# Patient Record
Sex: MALE | Race: BLACK OR AFRICAN AMERICAN
[De-identification: names, ages, dates, MRNs, and addresses within clinical notes are randomized per-mention and may not be internally consistent; named-entity substitution may affect disease eponyms.]

---

## 2019-11-12 ENCOUNTER — HOSPITAL ENCOUNTER (INPATIENT)
Dept: HOSPITAL 15 - ER | Age: 69
LOS: 3 days | Discharge: HOME | DRG: 697 | End: 2019-11-15
Attending: INTERNAL MEDICINE | Admitting: NURSE PRACTITIONER
Payer: COMMERCIAL

## 2019-11-12 VITALS — HEIGHT: 65 IN | WEIGHT: 137.13 LBS | BODY MASS INDEX: 22.85 KG/M2

## 2019-11-12 DIAGNOSIS — N40.1: ICD-10-CM

## 2019-11-12 DIAGNOSIS — N39.498: ICD-10-CM

## 2019-11-12 DIAGNOSIS — E11.9: ICD-10-CM

## 2019-11-12 DIAGNOSIS — N35.919: Primary | ICD-10-CM

## 2019-11-12 DIAGNOSIS — N13.30: ICD-10-CM

## 2019-11-12 DIAGNOSIS — E87.6: ICD-10-CM

## 2019-11-12 DIAGNOSIS — K62.5: ICD-10-CM

## 2019-11-12 DIAGNOSIS — R33.8: ICD-10-CM

## 2019-11-12 DIAGNOSIS — I10: ICD-10-CM

## 2019-11-12 DIAGNOSIS — D64.9: ICD-10-CM

## 2019-11-12 DIAGNOSIS — I69.354: ICD-10-CM

## 2019-11-12 LAB
ALBUMIN SERPL-MCNC: 3 G/DL (ref 3.4–5)
ALP SERPL-CCNC: 80 U/L (ref 45–117)
ALT SERPL-CCNC: 16 U/L (ref 16–61)
ANION GAP SERPL CALCULATED.3IONS-SCNC: 5 MMOL/L (ref 5–15)
APTT PPP: 27.5 SEC (ref 23.64–32.05)
BILIRUB SERPL-MCNC: 0.4 MG/DL (ref 0.2–1)
BUN SERPL-MCNC: 20 MG/DL (ref 7–18)
BUN/CREAT SERPL: 22.2
CALCIUM SERPL-MCNC: 8.3 MG/DL (ref 8.5–10.1)
CHLORIDE SERPL-SCNC: 106 MMOL/L (ref 98–107)
CO2 SERPL-SCNC: 29 MMOL/L (ref 21–32)
GLUCOSE SERPL-MCNC: 98 MG/DL (ref 74–106)
HCT VFR BLD AUTO: 35.8 % (ref 41–53)
HCT VFR BLD AUTO: 37.6 % (ref 41–53)
HGB BLD-MCNC: 12 G/DL (ref 13.5–17.5)
HGB BLD-MCNC: 12.6 G/DL (ref 13.5–17.5)
INR PPP: 1.14 (ref 0.9–1.15)
MCH RBC QN AUTO: 30.2 PG (ref 28–32)
MCV RBC AUTO: 89.7 FL (ref 80–100)
POTASSIUM SERPL-SCNC: 2.8 MMOL/L (ref 3.5–5.1)
PROT SERPL-MCNC: 7.5 G/DL (ref 6.4–8.2)
SODIUM SERPL-SCNC: 140 MMOL/L (ref 136–145)

## 2019-11-12 PROCEDURE — 36415 COLL VENOUS BLD VENIPUNCTURE: CPT

## 2019-11-12 PROCEDURE — 80053 COMPREHEN METABOLIC PANEL: CPT

## 2019-11-12 PROCEDURE — 86900 BLOOD TYPING SEROLOGIC ABO: CPT

## 2019-11-12 PROCEDURE — 85007 BL SMEAR W/DIFF WBC COUNT: CPT

## 2019-11-12 PROCEDURE — 85014 HEMATOCRIT: CPT

## 2019-11-12 PROCEDURE — 87086 URINE CULTURE/COLONY COUNT: CPT

## 2019-11-12 PROCEDURE — 81001 URINALYSIS AUTO W/SCOPE: CPT

## 2019-11-12 PROCEDURE — 96365 THER/PROPH/DIAG IV INF INIT: CPT

## 2019-11-12 PROCEDURE — 85027 COMPLETE CBC AUTOMATED: CPT

## 2019-11-12 PROCEDURE — 85610 PROTHROMBIN TIME: CPT

## 2019-11-12 PROCEDURE — 71045 X-RAY EXAM CHEST 1 VIEW: CPT

## 2019-11-12 PROCEDURE — 84132 ASSAY OF SERUM POTASSIUM: CPT

## 2019-11-12 PROCEDURE — 86901 BLOOD TYPING SEROLOGIC RH(D): CPT

## 2019-11-12 PROCEDURE — 80048 BASIC METABOLIC PNL TOTAL CA: CPT

## 2019-11-12 PROCEDURE — 85730 THROMBOPLASTIN TIME PARTIAL: CPT

## 2019-11-12 PROCEDURE — 96375 TX/PRO/DX INJ NEW DRUG ADDON: CPT

## 2019-11-12 PROCEDURE — 85018 HEMOGLOBIN: CPT

## 2019-11-12 PROCEDURE — 96361 HYDRATE IV INFUSION ADD-ON: CPT

## 2019-11-12 PROCEDURE — 96367 TX/PROPH/DG ADDL SEQ IV INF: CPT

## 2019-11-12 PROCEDURE — 86850 RBC ANTIBODY SCREEN: CPT

## 2019-11-12 PROCEDURE — 93005 ELECTROCARDIOGRAM TRACING: CPT

## 2019-11-12 PROCEDURE — 74176 CT ABD & PELVIS W/O CONTRAST: CPT

## 2019-11-12 PROCEDURE — 82962 GLUCOSE BLOOD TEST: CPT

## 2019-11-12 NOTE — NUR
MS admit from ER

ELSA KUMAR admitted to tele/MS. Patient oriented to Kayy Granda, primary RN, unit, 
room, bed, and unit policies regarding patient care and visiting hours. Patient weighed by 
bed scale and encouraged to call if they need something. All questions and concerns 
addressed, patient verbalized understanding.

## 2019-11-13 VITALS — SYSTOLIC BLOOD PRESSURE: 166 MMHG | DIASTOLIC BLOOD PRESSURE: 88 MMHG

## 2019-11-13 VITALS — DIASTOLIC BLOOD PRESSURE: 68 MMHG | SYSTOLIC BLOOD PRESSURE: 151 MMHG

## 2019-11-13 VITALS — DIASTOLIC BLOOD PRESSURE: 85 MMHG | SYSTOLIC BLOOD PRESSURE: 151 MMHG

## 2019-11-13 VITALS — DIASTOLIC BLOOD PRESSURE: 81 MMHG | SYSTOLIC BLOOD PRESSURE: 154 MMHG

## 2019-11-13 VITALS — SYSTOLIC BLOOD PRESSURE: 143 MMHG | DIASTOLIC BLOOD PRESSURE: 79 MMHG

## 2019-11-13 LAB
ANION GAP SERPL CALCULATED.3IONS-SCNC: 8 MMOL/L (ref 5–15)
BUN SERPL-MCNC: 19 MG/DL (ref 7–18)
BUN/CREAT SERPL: 21.8
CALCIUM SERPL-MCNC: 8.3 MG/DL (ref 8.5–10.1)
CHLORIDE SERPL-SCNC: 109 MMOL/L (ref 98–107)
CO2 SERPL-SCNC: 26 MMOL/L (ref 21–32)
GLUCOSE SERPL-MCNC: 86 MG/DL (ref 74–106)
HCT VFR BLD AUTO: 35.6 % (ref 41–53)
HGB BLD-MCNC: 11.9 G/DL (ref 13.5–17.5)
MCH RBC QN AUTO: 30.2 PG (ref 28–32)
MCV RBC AUTO: 90.5 FL (ref 80–100)
POTASSIUM SERPL-SCNC: 2.8 MMOL/L (ref 3.5–5.1)
SODIUM SERPL-SCNC: 143 MMOL/L (ref 136–145)
WBC CLUMPS UR QL AUTO: PRESENT /HPF

## 2019-11-13 RX ADMIN — HYDROCHLOROTHIAZIDE SCH MG: 25 TABLET ORAL at 10:22

## 2019-11-13 RX ADMIN — PANTOPRAZOLE SODIUM SCH MG: 40 TABLET, DELAYED RELEASE ORAL at 22:14

## 2019-11-13 RX ADMIN — HUMAN INSULIN SCH UNITS: 100 INJECTION, SOLUTION SUBCUTANEOUS at 12:00

## 2019-11-13 RX ADMIN — DONEPEZIL HYDROCHLORIDE SCH MG: 5 TABLET, FILM COATED ORAL at 00:04

## 2019-11-13 RX ADMIN — Medication SCH STRIP: at 00:05

## 2019-11-13 RX ADMIN — Medication SCH STRIP: at 17:49

## 2019-11-13 RX ADMIN — CEFTRIAXONE SODIUM SCH MLS/HR: 1 INJECTION, POWDER, FOR SOLUTION INTRAMUSCULAR; INTRAVENOUS at 20:46

## 2019-11-13 RX ADMIN — SODIUM CHLORIDE SCH MLS/HR: 0.9 INJECTION, SOLUTION INTRAVENOUS at 10:23

## 2019-11-13 RX ADMIN — HUMAN INSULIN SCH UNITS: 100 INJECTION, SOLUTION SUBCUTANEOUS at 00:00

## 2019-11-13 RX ADMIN — HUMAN INSULIN SCH UNITS: 100 INJECTION, SOLUTION SUBCUTANEOUS at 23:40

## 2019-11-13 RX ADMIN — HUMAN INSULIN SCH UNITS: 100 INJECTION, SOLUTION SUBCUTANEOUS at 05:38

## 2019-11-13 RX ADMIN — PANTOPRAZOLE SODIUM SCH MG: 40 TABLET, DELAYED RELEASE ORAL at 00:05

## 2019-11-13 RX ADMIN — TAMSULOSIN HYDROCHLORIDE SCH MG: 0.4 CAPSULE ORAL at 17:48

## 2019-11-13 RX ADMIN — DONEPEZIL HYDROCHLORIDE SCH MG: 5 TABLET, FILM COATED ORAL at 22:14

## 2019-11-13 RX ADMIN — ATORVASTATIN CALCIUM SCH MG: 20 TABLET, FILM COATED ORAL at 00:04

## 2019-11-13 RX ADMIN — Medication SCH STRIP: at 12:38

## 2019-11-13 RX ADMIN — Medication SCH STRIP: at 05:38

## 2019-11-13 RX ADMIN — HUMAN INSULIN SCH UNITS: 100 INJECTION, SOLUTION SUBCUTANEOUS at 17:48

## 2019-11-13 RX ADMIN — LOSARTAN POTASSIUM SCH MG: 25 TABLET, FILM COATED ORAL at 10:22

## 2019-11-13 RX ADMIN — SODIUM CHLORIDE SCH MLS/HR: 0.9 INJECTION, SOLUTION INTRAVENOUS at 00:04

## 2019-11-13 RX ADMIN — ATORVASTATIN CALCIUM SCH MG: 20 TABLET, FILM COATED ORAL at 22:14

## 2019-11-13 RX ADMIN — Medication SCH STRIP: at 23:40

## 2019-11-13 RX ADMIN — PANTOPRAZOLE SODIUM SCH MG: 40 TABLET, DELAYED RELEASE ORAL at 10:22

## 2019-11-13 NOTE — NUR
Opening Shift Note

Received report on the patient. Awake lying in bed. Patient shows no signs of distress at 
this time. Discussed the plan of care with the patient. Bed in lowest position, side rails 
up x2, and the call light is within reach. Will continue to monitor.

## 2019-11-14 VITALS — DIASTOLIC BLOOD PRESSURE: 70 MMHG | SYSTOLIC BLOOD PRESSURE: 128 MMHG

## 2019-11-14 VITALS — DIASTOLIC BLOOD PRESSURE: 74 MMHG | SYSTOLIC BLOOD PRESSURE: 136 MMHG

## 2019-11-14 VITALS — SYSTOLIC BLOOD PRESSURE: 137 MMHG | DIASTOLIC BLOOD PRESSURE: 71 MMHG

## 2019-11-14 VITALS — DIASTOLIC BLOOD PRESSURE: 83 MMHG | SYSTOLIC BLOOD PRESSURE: 146 MMHG

## 2019-11-14 VITALS — SYSTOLIC BLOOD PRESSURE: 136 MMHG | DIASTOLIC BLOOD PRESSURE: 74 MMHG

## 2019-11-14 PROCEDURE — 0T7D8ZZ DILATION OF URETHRA, VIA NATURAL OR ARTIFICIAL OPENING ENDOSCOPIC: ICD-10-PCS | Performed by: UROLOGY

## 2019-11-14 PROCEDURE — 0T9B80Z DRAINAGE OF BLADDER WITH DRAINAGE DEVICE, VIA NATURAL OR ARTIFICIAL OPENING ENDOSCOPIC: ICD-10-PCS | Performed by: UROLOGY

## 2019-11-14 RX ADMIN — ACETAMINOPHEN PRN MG: 325 TABLET ORAL at 13:42

## 2019-11-14 RX ADMIN — TAMSULOSIN HYDROCHLORIDE SCH MG: 0.4 CAPSULE ORAL at 17:44

## 2019-11-14 RX ADMIN — ACETAMINOPHEN PRN MG: 325 TABLET ORAL at 19:42

## 2019-11-14 RX ADMIN — ATORVASTATIN CALCIUM SCH MG: 20 TABLET, FILM COATED ORAL at 21:49

## 2019-11-14 RX ADMIN — HUMAN INSULIN SCH UNITS: 100 INJECTION, SOLUTION SUBCUTANEOUS at 12:00

## 2019-11-14 RX ADMIN — PANTOPRAZOLE SODIUM SCH MG: 40 TABLET, DELAYED RELEASE ORAL at 09:50

## 2019-11-14 RX ADMIN — Medication SCH STRIP: at 17:44

## 2019-11-14 RX ADMIN — HYDROCHLOROTHIAZIDE SCH MG: 25 TABLET ORAL at 09:50

## 2019-11-14 RX ADMIN — Medication SCH STRIP: at 05:50

## 2019-11-14 RX ADMIN — PANTOPRAZOLE SODIUM SCH MG: 40 TABLET, DELAYED RELEASE ORAL at 21:50

## 2019-11-14 RX ADMIN — DONEPEZIL HYDROCHLORIDE SCH MG: 5 TABLET, FILM COATED ORAL at 21:50

## 2019-11-14 RX ADMIN — SODIUM CHLORIDE SCH MLS/HR: 0.9 INJECTION, SOLUTION INTRAVENOUS at 05:49

## 2019-11-14 RX ADMIN — HUMAN INSULIN SCH UNITS: 100 INJECTION, SOLUTION SUBCUTANEOUS at 05:51

## 2019-11-14 RX ADMIN — CEFTRIAXONE SODIUM SCH MLS/HR: 1 INJECTION, POWDER, FOR SOLUTION INTRAMUSCULAR; INTRAVENOUS at 21:50

## 2019-11-14 RX ADMIN — LOSARTAN POTASSIUM SCH MG: 25 TABLET, FILM COATED ORAL at 09:51

## 2019-11-14 RX ADMIN — Medication SCH STRIP: at 12:00

## 2019-11-14 RX ADMIN — SODIUM CHLORIDE SCH MLS/HR: 0.9 INJECTION, SOLUTION INTRAVENOUS at 13:30

## 2019-11-14 RX ADMIN — HUMAN INSULIN SCH UNITS: 100 INJECTION, SOLUTION SUBCUTANEOUS at 17:44

## 2019-11-14 NOTE — NUR
OPENING NOTE

ASSUMED CARE OF PT. ALERT AND ORIENTED. NO S/S OF SOB/DISTRESS NOTED. DENIES ANY PAIN. 
SAFETY PRECAUTIONS IN PLACE. BED SET TO LOWEST POSITION/LOCKED. BEDSIDE RIALS UP X2. BED 
ALARM ON.  CALL LIGHT WITHIN REACH. INSTRUCTED PT TO CALL FOR ASSISTANCE. UPDATED ON POC. PT 
VERBALIZED UNDERSTANDING. WILL CONTINUE TO MONITOR Q 1HR AND PRN.

## 2019-11-14 NOTE — NUR
Opening Shift Note

Assumed care of patient, awake, AAOX4.  No S/S of distress/SOB.  Patient C/O pain 6/10 to 
lower ABD.  On room air and on bedrest.  S/P cystoscopy, aly catheter patent and draining 
to gravity.  Bed in lowest locked position, side rails up x2, call light within reach.  
Instructed on POC and to call for assist PRN, will continue to monitor for changes Q1hr and 
PRN.

## 2019-11-15 VITALS — DIASTOLIC BLOOD PRESSURE: 72 MMHG | SYSTOLIC BLOOD PRESSURE: 142 MMHG

## 2019-11-15 VITALS — DIASTOLIC BLOOD PRESSURE: 78 MMHG | SYSTOLIC BLOOD PRESSURE: 143 MMHG

## 2019-11-15 VITALS — SYSTOLIC BLOOD PRESSURE: 153 MMHG | DIASTOLIC BLOOD PRESSURE: 80 MMHG

## 2019-11-15 RX ADMIN — ACETAMINOPHEN PRN MG: 325 TABLET ORAL at 04:56

## 2019-11-15 RX ADMIN — Medication SCH STRIP: at 00:08

## 2019-11-15 RX ADMIN — SODIUM CHLORIDE SCH MLS/HR: 0.9 INJECTION, SOLUTION INTRAVENOUS at 16:10

## 2019-11-15 RX ADMIN — LOSARTAN POTASSIUM SCH MG: 25 TABLET, FILM COATED ORAL at 09:42

## 2019-11-15 RX ADMIN — Medication SCH STRIP: at 18:03

## 2019-11-15 RX ADMIN — Medication SCH STRIP: at 05:53

## 2019-11-15 RX ADMIN — SODIUM CHLORIDE SCH MLS/HR: 0.9 INJECTION, SOLUTION INTRAVENOUS at 03:04

## 2019-11-15 RX ADMIN — HUMAN INSULIN SCH UNITS: 100 INJECTION, SOLUTION SUBCUTANEOUS at 18:00

## 2019-11-15 RX ADMIN — TAMSULOSIN HYDROCHLORIDE SCH MG: 0.4 CAPSULE ORAL at 18:00

## 2019-11-15 RX ADMIN — HUMAN INSULIN SCH UNITS: 100 INJECTION, SOLUTION SUBCUTANEOUS at 12:00

## 2019-11-15 RX ADMIN — ACETAMINOPHEN PRN MG: 325 TABLET ORAL at 11:04

## 2019-11-15 RX ADMIN — PANTOPRAZOLE SODIUM SCH MG: 40 TABLET, DELAYED RELEASE ORAL at 09:41

## 2019-11-15 RX ADMIN — HYDROCHLOROTHIAZIDE SCH MG: 25 TABLET ORAL at 09:43

## 2019-11-15 RX ADMIN — HUMAN INSULIN SCH UNITS: 100 INJECTION, SOLUTION SUBCUTANEOUS at 00:09

## 2019-11-15 RX ADMIN — Medication SCH STRIP: at 12:06

## 2019-11-15 RX ADMIN — HUMAN INSULIN SCH UNITS: 100 INJECTION, SOLUTION SUBCUTANEOUS at 05:53

## 2019-11-15 NOTE — NUR
Pain

patient complains of pain in groin. Will medicate with PRN medications. Will continue to 
monitor Q1 hour and PRN.

## 2019-11-15 NOTE — NUR
Leo Gregg at bedside

Discussing plan of care with patient. Will continue to monitor Q1 hour and PRN.

## 2019-11-15 NOTE — NUR
Opening Note

Received report from night shift RN. Patient is awake, alert and oriented. No signs or 
symptoms of distress noted at this time. Patient is on room air, respirations even and 
unlabored. Reviewed plan of care with patient, patient verbalized understanding. Bed in low 
and locked position, call light within reach. Will continue to monitor Q1 hour and PRN. Bed 
alarm on for safety.

## 2019-11-15 NOTE — NUR
Discharge

Discharge instructions given as ordered. Encourage to follow up with PMD as instructed. All 
questions and concerns addressed. Patient verbalized understanding.  Medication 
reconciliation form completed and copy given to patient. IV catheter removed, pressure 
dressing applied, patient tolerated well. Patient taken down to private vehicle via 
wheelchair, with all personal belongings, accompanied by staff. No signs or symptoms of 
distress noted at this time.

## 2019-12-23 ENCOUNTER — HOSPITAL ENCOUNTER (EMERGENCY)
Dept: HOSPITAL 15 - ER | Age: 69
LOS: 1 days | Discharge: HOME | End: 2019-12-24
Payer: COMMERCIAL

## 2019-12-23 VITALS — BODY MASS INDEX: 21.22 KG/M2 | HEIGHT: 68 IN | WEIGHT: 140 LBS

## 2019-12-23 DIAGNOSIS — E11.9: ICD-10-CM

## 2019-12-23 DIAGNOSIS — R31.9: Primary | ICD-10-CM

## 2019-12-23 DIAGNOSIS — Z85.46: ICD-10-CM

## 2019-12-23 DIAGNOSIS — I10: ICD-10-CM

## 2019-12-23 PROCEDURE — 87086 URINE CULTURE/COLONY COUNT: CPT

## 2019-12-23 PROCEDURE — 85610 PROTHROMBIN TIME: CPT

## 2019-12-23 PROCEDURE — 85730 THROMBOPLASTIN TIME PARTIAL: CPT

## 2019-12-23 PROCEDURE — 36415 COLL VENOUS BLD VENIPUNCTURE: CPT

## 2019-12-23 PROCEDURE — 80053 COMPREHEN METABOLIC PANEL: CPT

## 2019-12-23 PROCEDURE — 85025 COMPLETE CBC W/AUTO DIFF WBC: CPT

## 2019-12-23 PROCEDURE — 99284 EMERGENCY DEPT VISIT MOD MDM: CPT

## 2019-12-23 PROCEDURE — 74176 CT ABD & PELVIS W/O CONTRAST: CPT

## 2019-12-24 VITALS — DIASTOLIC BLOOD PRESSURE: 69 MMHG | SYSTOLIC BLOOD PRESSURE: 129 MMHG

## 2019-12-24 LAB
ALBUMIN SERPL-MCNC: 3.5 G/DL (ref 3.4–5)
ALP SERPL-CCNC: 108 U/L (ref 45–117)
ALT SERPL-CCNC: 38 U/L (ref 16–61)
ANION GAP SERPL CALCULATED.3IONS-SCNC: 6 MMOL/L (ref 5–15)
APTT PPP: 26.2 SEC (ref 23.64–32.05)
BILIRUB SERPL-MCNC: 0.2 MG/DL (ref 0.2–1)
BUN SERPL-MCNC: 18 MG/DL (ref 7–18)
BUN/CREAT SERPL: 21.7
CALCIUM SERPL-MCNC: 8.8 MG/DL (ref 8.5–10.1)
CHLORIDE SERPL-SCNC: 108 MMOL/L (ref 98–107)
CO2 SERPL-SCNC: 28 MMOL/L (ref 21–32)
GLUCOSE SERPL-MCNC: 125 MG/DL (ref 74–106)
HCT VFR BLD AUTO: 37.7 % (ref 41–53)
HGB BLD-MCNC: 12.5 G/DL (ref 13.5–17.5)
INR PPP: 1.12 (ref 0.9–1.15)
MCH RBC QN AUTO: 29.1 PG (ref 28–32)
MCV RBC AUTO: 87.5 FL (ref 80–100)
NRBC BLD QL AUTO: 0 %
POTASSIUM SERPL-SCNC: 3 MMOL/L (ref 3.5–5.1)
PROT SERPL-MCNC: 7.5 G/DL (ref 6.4–8.2)
SODIUM SERPL-SCNC: 142 MMOL/L (ref 136–145)

## 2019-12-30 ENCOUNTER — HOSPITAL ENCOUNTER (EMERGENCY)
Dept: HOSPITAL 15 - ER | Age: 69
Discharge: HOME | End: 2019-12-30
Payer: COMMERCIAL

## 2019-12-30 VITALS — HEIGHT: 71 IN | WEIGHT: 165 LBS | BODY MASS INDEX: 23.1 KG/M2

## 2019-12-30 VITALS — DIASTOLIC BLOOD PRESSURE: 98 MMHG | SYSTOLIC BLOOD PRESSURE: 152 MMHG

## 2019-12-30 DIAGNOSIS — I10: ICD-10-CM

## 2019-12-30 DIAGNOSIS — R33.9: ICD-10-CM

## 2019-12-30 DIAGNOSIS — N39.0: Primary | ICD-10-CM

## 2019-12-30 DIAGNOSIS — E11.9: ICD-10-CM

## 2019-12-30 PROCEDURE — 51702 INSERT TEMP BLADDER CATH: CPT

## 2019-12-30 PROCEDURE — 81001 URINALYSIS AUTO W/SCOPE: CPT

## 2020-01-20 ENCOUNTER — HOSPITAL ENCOUNTER (EMERGENCY)
Dept: HOSPITAL 15 - ER | Age: 70
Discharge: HOME | End: 2020-01-20
Payer: COMMERCIAL

## 2020-01-20 VITALS — SYSTOLIC BLOOD PRESSURE: 147 MMHG | DIASTOLIC BLOOD PRESSURE: 83 MMHG

## 2020-01-20 VITALS — BODY MASS INDEX: 21.22 KG/M2 | HEIGHT: 68 IN | WEIGHT: 140 LBS

## 2020-01-20 DIAGNOSIS — E11.9: ICD-10-CM

## 2020-01-20 DIAGNOSIS — Z86.73: ICD-10-CM

## 2020-01-20 DIAGNOSIS — E78.5: ICD-10-CM

## 2020-01-20 DIAGNOSIS — N40.1: ICD-10-CM

## 2020-01-20 DIAGNOSIS — N39.0: Primary | ICD-10-CM

## 2020-01-20 DIAGNOSIS — R33.8: ICD-10-CM

## 2020-01-20 DIAGNOSIS — I10: ICD-10-CM

## 2020-01-20 LAB
ALBUMIN SERPL-MCNC: 3.3 G/DL (ref 3.4–5)
ALP SERPL-CCNC: 80 U/L (ref 45–117)
ALT SERPL-CCNC: 20 U/L (ref 16–61)
ANION GAP SERPL CALCULATED.3IONS-SCNC: 6 MMOL/L (ref 5–15)
BILIRUB SERPL-MCNC: 0.3 MG/DL (ref 0.2–1)
BUN SERPL-MCNC: 17 MG/DL (ref 7–18)
BUN/CREAT SERPL: 18.7
CALCIUM SERPL-MCNC: 8.1 MG/DL (ref 8.5–10.1)
CHLORIDE SERPL-SCNC: 112 MMOL/L (ref 98–107)
CO2 SERPL-SCNC: 25 MMOL/L (ref 21–32)
GLUCOSE SERPL-MCNC: 109 MG/DL (ref 74–106)
HCT VFR BLD AUTO: 36.4 % (ref 41–53)
HGB BLD-MCNC: 11.9 G/DL (ref 13.5–17.5)
LACTATE PLASV-SCNC: 2.1 MMOL/L (ref 0.4–2)
MCH RBC QN AUTO: 28.7 PG (ref 28–32)
MCV RBC AUTO: 87.8 FL (ref 80–100)
NRBC BLD QL AUTO: 0.1 %
POTASSIUM SERPL-SCNC: 3.3 MMOL/L (ref 3.5–5.1)
PROT SERPL-MCNC: 6.6 G/DL (ref 6.4–8.2)
SODIUM SERPL-SCNC: 143 MMOL/L (ref 136–145)

## 2020-01-20 PROCEDURE — 36415 COLL VENOUS BLD VENIPUNCTURE: CPT

## 2020-01-20 PROCEDURE — 83605 ASSAY OF LACTIC ACID: CPT

## 2020-01-20 PROCEDURE — 80053 COMPREHEN METABOLIC PANEL: CPT

## 2020-01-20 PROCEDURE — 51702 INSERT TEMP BLADDER CATH: CPT

## 2020-01-20 PROCEDURE — 81001 URINALYSIS AUTO W/SCOPE: CPT

## 2020-01-20 PROCEDURE — 85025 COMPLETE CBC W/AUTO DIFF WBC: CPT

## 2020-03-29 ENCOUNTER — HOSPITAL ENCOUNTER (EMERGENCY)
Dept: HOSPITAL 15 - ER | Age: 70
Discharge: HOME | End: 2020-03-29
Payer: COMMERCIAL

## 2020-03-29 VITALS — SYSTOLIC BLOOD PRESSURE: 128 MMHG | DIASTOLIC BLOOD PRESSURE: 78 MMHG

## 2020-03-29 VITALS — HEIGHT: 66 IN | BODY MASS INDEX: 20.89 KG/M2 | WEIGHT: 130 LBS

## 2020-03-29 DIAGNOSIS — E11.9: ICD-10-CM

## 2020-03-29 DIAGNOSIS — Z79.899: ICD-10-CM

## 2020-03-29 DIAGNOSIS — N41.9: ICD-10-CM

## 2020-03-29 DIAGNOSIS — T83.091A: Primary | ICD-10-CM

## 2020-03-29 DIAGNOSIS — I10: ICD-10-CM

## 2020-03-29 DIAGNOSIS — N13.9: ICD-10-CM

## 2020-03-29 DIAGNOSIS — E78.5: ICD-10-CM

## 2020-03-29 DIAGNOSIS — Z86.73: ICD-10-CM

## 2020-03-29 PROCEDURE — 81001 URINALYSIS AUTO W/SCOPE: CPT

## 2020-10-12 ENCOUNTER — HOSPITAL ENCOUNTER (EMERGENCY)
Dept: HOSPITAL 15 - ER | Age: 70
Discharge: HOME | End: 2020-10-12
Payer: COMMERCIAL

## 2020-10-12 VITALS — WEIGHT: 130 LBS | HEIGHT: 65 IN | BODY MASS INDEX: 21.66 KG/M2

## 2020-10-12 VITALS — SYSTOLIC BLOOD PRESSURE: 125 MMHG | DIASTOLIC BLOOD PRESSURE: 67 MMHG

## 2020-10-12 DIAGNOSIS — I10: ICD-10-CM

## 2020-10-12 DIAGNOSIS — Z86.73: ICD-10-CM

## 2020-10-12 DIAGNOSIS — E11.9: ICD-10-CM

## 2020-10-12 DIAGNOSIS — N39.0: ICD-10-CM

## 2020-10-12 DIAGNOSIS — E78.5: ICD-10-CM

## 2020-10-12 DIAGNOSIS — R33.9: Primary | ICD-10-CM

## 2020-10-12 LAB — WBC CLUMPS UR QL AUTO: PRESENT /HPF

## 2020-10-12 PROCEDURE — 87086 URINE CULTURE/COLONY COUNT: CPT

## 2020-10-12 PROCEDURE — 51702 INSERT TEMP BLADDER CATH: CPT

## 2020-10-12 PROCEDURE — 81001 URINALYSIS AUTO W/SCOPE: CPT

## 2020-11-23 ENCOUNTER — HOSPITAL ENCOUNTER (EMERGENCY)
Dept: HOSPITAL 15 - ER | Age: 70
Discharge: HOME | End: 2020-11-23
Payer: COMMERCIAL

## 2020-11-23 VITALS — DIASTOLIC BLOOD PRESSURE: 103 MMHG | SYSTOLIC BLOOD PRESSURE: 175 MMHG

## 2020-11-23 VITALS — HEIGHT: 68 IN | WEIGHT: 160 LBS | BODY MASS INDEX: 24.25 KG/M2

## 2020-11-23 DIAGNOSIS — I63.9: ICD-10-CM

## 2020-11-23 DIAGNOSIS — N20.0: Primary | ICD-10-CM

## 2020-11-23 DIAGNOSIS — N39.0: ICD-10-CM

## 2020-11-23 DIAGNOSIS — I10: ICD-10-CM

## 2020-11-23 DIAGNOSIS — N40.0: ICD-10-CM

## 2020-11-23 DIAGNOSIS — E78.5: ICD-10-CM

## 2020-11-23 DIAGNOSIS — E11.9: ICD-10-CM

## 2020-11-23 LAB
ALBUMIN SERPL-MCNC: 3.8 G/DL (ref 3.4–5)
ALP SERPL-CCNC: 96 U/L (ref 45–117)
ALT SERPL-CCNC: 20 U/L (ref 16–61)
ANION GAP SERPL CALCULATED.3IONS-SCNC: 12 MMOL/L (ref 5–15)
BILIRUB SERPL-MCNC: 0.5 MG/DL (ref 0.2–1)
BUN SERPL-MCNC: 18 MG/DL (ref 7–18)
BUN/CREAT SERPL: 18.4
CALCIUM SERPL-MCNC: 9.3 MG/DL (ref 8.5–10.1)
CHLORIDE SERPL-SCNC: 106 MMOL/L (ref 98–107)
CO2 SERPL-SCNC: 21 MMOL/L (ref 21–32)
GLUCOSE SERPL-MCNC: 173 MG/DL (ref 74–106)
HCT VFR BLD AUTO: 41 % (ref 41–53)
HGB BLD-MCNC: 13.4 G/DL (ref 13.5–17.5)
MCH RBC QN AUTO: 28.1 PG (ref 28–32)
MCV RBC AUTO: 86.2 FL (ref 80–100)
NRBC BLD QL AUTO: 0 %
POTASSIUM SERPL-SCNC: 3.9 MMOL/L (ref 3.5–5.1)
PROT SERPL-MCNC: 8.1 G/DL (ref 6.4–8.2)
SODIUM SERPL-SCNC: 139 MMOL/L (ref 136–145)

## 2020-11-23 PROCEDURE — 71045 X-RAY EXAM CHEST 1 VIEW: CPT

## 2020-11-23 PROCEDURE — 74176 CT ABD & PELVIS W/O CONTRAST: CPT

## 2020-11-23 PROCEDURE — 84484 ASSAY OF TROPONIN QUANT: CPT

## 2020-11-23 PROCEDURE — 85025 COMPLETE CBC W/AUTO DIFF WBC: CPT

## 2020-11-23 PROCEDURE — 80053 COMPREHEN METABOLIC PANEL: CPT

## 2020-11-23 PROCEDURE — 36415 COLL VENOUS BLD VENIPUNCTURE: CPT

## 2020-11-26 ENCOUNTER — HOSPITAL ENCOUNTER (INPATIENT)
Dept: HOSPITAL 15 - ER | Age: 70
LOS: 11 days | Discharge: HOME HEALTH | DRG: 871 | End: 2020-12-07
Attending: INTERNAL MEDICINE | Admitting: INTERNAL MEDICINE
Payer: COMMERCIAL

## 2020-11-26 VITALS — BODY MASS INDEX: 25.69 KG/M2 | WEIGHT: 169.54 LBS | HEIGHT: 68 IN

## 2020-11-26 DIAGNOSIS — E44.0: ICD-10-CM

## 2020-11-26 DIAGNOSIS — E11.22: ICD-10-CM

## 2020-11-26 DIAGNOSIS — E78.5: ICD-10-CM

## 2020-11-26 DIAGNOSIS — N13.6: ICD-10-CM

## 2020-11-26 DIAGNOSIS — I25.10: ICD-10-CM

## 2020-11-26 DIAGNOSIS — E86.0: ICD-10-CM

## 2020-11-26 DIAGNOSIS — N32.0: ICD-10-CM

## 2020-11-26 DIAGNOSIS — D64.9: ICD-10-CM

## 2020-11-26 DIAGNOSIS — E87.6: ICD-10-CM

## 2020-11-26 DIAGNOSIS — N31.9: ICD-10-CM

## 2020-11-26 DIAGNOSIS — I12.9: ICD-10-CM

## 2020-11-26 DIAGNOSIS — I69.354: ICD-10-CM

## 2020-11-26 DIAGNOSIS — A41.9: Primary | ICD-10-CM

## 2020-11-26 DIAGNOSIS — K57.30: ICD-10-CM

## 2020-11-26 DIAGNOSIS — Z20.828: ICD-10-CM

## 2020-11-26 DIAGNOSIS — N40.1: ICD-10-CM

## 2020-11-26 DIAGNOSIS — N18.9: ICD-10-CM

## 2020-11-26 DIAGNOSIS — H91.90: ICD-10-CM

## 2020-11-26 DIAGNOSIS — R33.8: ICD-10-CM

## 2020-11-26 DIAGNOSIS — N17.0: ICD-10-CM

## 2020-11-26 LAB
ALBUMIN SERPL-MCNC: 2.8 G/DL (ref 3.4–5)
ALP SERPL-CCNC: 79 U/L (ref 45–117)
ALT SERPL-CCNC: 14 U/L (ref 16–61)
AMYLASE SERPL-CCNC: 37 U/L (ref 25–115)
ANION GAP SERPL CALCULATED.3IONS-SCNC: 16 MMOL/L (ref 5–15)
APTT PPP: 32 SEC (ref 23–31.2)
BILIRUB SERPL-MCNC: 1.3 MG/DL (ref 0.2–1)
BUN SERPL-MCNC: 76 MG/DL (ref 7–18)
BUN/CREAT SERPL: 10.4
CALCIUM SERPL-MCNC: 8.2 MG/DL (ref 8.5–10.1)
CHLORIDE SERPL-SCNC: 104 MMOL/L (ref 98–107)
CO2 SERPL-SCNC: 20 MMOL/L (ref 21–32)
GLUCOSE SERPL-MCNC: 88 MG/DL (ref 74–106)
HCT VFR BLD AUTO: 35.2 % (ref 41–53)
HGB BLD-MCNC: 11.5 G/DL (ref 13.5–17.5)
INR PPP: 1.34 (ref 0.9–1.15)
LACTATE PLASV-SCNC: 4.2 MMOL/L (ref 0.4–2)
LIPASE SERPL-CCNC: 19 U/L (ref 73–393)
MAGNESIUM SERPL-MCNC: 2.3 MG/DL (ref 1.6–2.6)
MCH RBC QN AUTO: 28.4 PG (ref 28–32)
MCV RBC AUTO: 86.6 FL (ref 80–100)
POTASSIUM SERPL-SCNC: 4.1 MMOL/L (ref 3.5–5.1)
PROT SERPL-MCNC: 7.5 G/DL (ref 6.4–8.2)
SODIUM SERPL-SCNC: 140 MMOL/L (ref 136–145)
WBC CLUMPS UR QL AUTO: PRESENT /HPF

## 2020-11-26 PROCEDURE — 83605 ASSAY OF LACTIC ACID: CPT

## 2020-11-26 PROCEDURE — 36415 COLL VENOUS BLD VENIPUNCTURE: CPT

## 2020-11-26 PROCEDURE — 85027 COMPLETE CBC AUTOMATED: CPT

## 2020-11-26 PROCEDURE — 84484 ASSAY OF TROPONIN QUANT: CPT

## 2020-11-26 PROCEDURE — 87088 URINE BACTERIA CULTURE: CPT

## 2020-11-26 PROCEDURE — 94640 AIRWAY INHALATION TREATMENT: CPT

## 2020-11-26 PROCEDURE — 84300 ASSAY OF URINE SODIUM: CPT

## 2020-11-26 PROCEDURE — 84100 ASSAY OF PHOSPHORUS: CPT

## 2020-11-26 PROCEDURE — 85007 BL SMEAR W/DIFF WBC COUNT: CPT

## 2020-11-26 PROCEDURE — 83690 ASSAY OF LIPASE: CPT

## 2020-11-26 PROCEDURE — 83880 ASSAY OF NATRIURETIC PEPTIDE: CPT

## 2020-11-26 PROCEDURE — 87077 CULTURE AEROBIC IDENTIFY: CPT

## 2020-11-26 PROCEDURE — 82570 ASSAY OF URINE CREATININE: CPT

## 2020-11-26 PROCEDURE — 97530 THERAPEUTIC ACTIVITIES: CPT

## 2020-11-26 PROCEDURE — 87040 BLOOD CULTURE FOR BACTERIA: CPT

## 2020-11-26 PROCEDURE — 83735 ASSAY OF MAGNESIUM: CPT

## 2020-11-26 PROCEDURE — 87186 SC STD MICRODIL/AGAR DIL: CPT

## 2020-11-26 PROCEDURE — 87086 URINE CULTURE/COLONY COUNT: CPT

## 2020-11-26 PROCEDURE — 80076 HEPATIC FUNCTION PANEL: CPT

## 2020-11-26 PROCEDURE — 80048 BASIC METABOLIC PNL TOTAL CA: CPT

## 2020-11-26 PROCEDURE — 82150 ASSAY OF AMYLASE: CPT

## 2020-11-26 PROCEDURE — 80202 ASSAY OF VANCOMYCIN: CPT

## 2020-11-26 PROCEDURE — 74176 CT ABD & PELVIS W/O CONTRAST: CPT

## 2020-11-26 PROCEDURE — 71045 X-RAY EXAM CHEST 1 VIEW: CPT

## 2020-11-26 PROCEDURE — 87426 SARSCOV CORONAVIRUS AG IA: CPT

## 2020-11-26 PROCEDURE — 80053 COMPREHEN METABOLIC PANEL: CPT

## 2020-11-26 PROCEDURE — 85025 COMPLETE CBC W/AUTO DIFF WBC: CPT

## 2020-11-26 PROCEDURE — 84156 ASSAY OF PROTEIN URINE: CPT

## 2020-11-26 PROCEDURE — 85610 PROTHROMBIN TIME: CPT

## 2020-11-26 PROCEDURE — 99291 CRITICAL CARE FIRST HOUR: CPT

## 2020-11-26 PROCEDURE — 97110 THERAPEUTIC EXERCISES: CPT

## 2020-11-26 PROCEDURE — 85730 THROMBOPLASTIN TIME PARTIAL: CPT

## 2020-11-26 PROCEDURE — 81001 URINALYSIS AUTO W/SCOPE: CPT

## 2020-11-26 RX ADMIN — PANTOPRAZOLE SODIUM SCH MG: 40 TABLET, DELAYED RELEASE ORAL at 22:22

## 2020-11-26 RX ADMIN — SODIUM CHLORIDE SCH MLS/HR: 0.9 INJECTION, SOLUTION INTRAVENOUS at 15:57

## 2020-11-26 RX ADMIN — SODIUM CHLORIDE SCH MLS/HR: 0.9 INJECTION, SOLUTION INTRAVENOUS at 22:23

## 2020-11-27 VITALS — DIASTOLIC BLOOD PRESSURE: 59 MMHG | SYSTOLIC BLOOD PRESSURE: 96 MMHG

## 2020-11-27 LAB
ALBUMIN SERPL-MCNC: 2.6 G/DL (ref 3.4–5)
ALP SERPL-CCNC: 86 U/L (ref 45–117)
ALT SERPL-CCNC: 14 U/L (ref 16–61)
ANION GAP SERPL CALCULATED.3IONS-SCNC: 13 MMOL/L (ref 5–15)
BILIRUB SERPL-MCNC: 0.8 MG/DL (ref 0.2–1)
BUN SERPL-MCNC: 85 MG/DL (ref 7–18)
BUN/CREAT SERPL: 13.1
CALCIUM SERPL-MCNC: 7.8 MG/DL (ref 8.5–10.1)
CHLORIDE SERPL-SCNC: 108 MMOL/L (ref 98–107)
CO2 SERPL-SCNC: 19 MMOL/L (ref 21–32)
GLUCOSE SERPL-MCNC: 92 MG/DL (ref 74–106)
HCT VFR BLD AUTO: 39 % (ref 41–53)
HGB BLD-MCNC: 12.6 G/DL (ref 13.5–17.5)
MCH RBC QN AUTO: 27.9 PG (ref 28–32)
MCV RBC AUTO: 86.1 FL (ref 80–100)
POTASSIUM SERPL-SCNC: 4.2 MMOL/L (ref 3.5–5.1)
PROT SERPL-MCNC: 7.1 G/DL (ref 6.4–8.2)
PROT UR-MCNC: 7.1 MG/DL (ref 0–11.9)
SODIUM SERPL-SCNC: 140 MMOL/L (ref 136–145)
WBC CLUMPS UR QL AUTO: PRESENT /HPF

## 2020-11-27 RX ADMIN — HYDROCODONE BITARTRATE AND ACETAMINOPHEN PRN TAB: 5; 325 TABLET ORAL at 18:31

## 2020-11-27 RX ADMIN — SODIUM CHLORIDE SCH MLS/HR: 0.9 INJECTION, SOLUTION INTRAVENOUS at 11:30

## 2020-11-27 RX ADMIN — ALBUTEROL SULFATE SCH MG: 2.5 SOLUTION RESPIRATORY (INHALATION) at 22:00

## 2020-11-27 RX ADMIN — PANTOPRAZOLE SODIUM SCH MG: 40 TABLET, DELAYED RELEASE ORAL at 09:40

## 2020-11-27 RX ADMIN — SODIUM CHLORIDE SCH MLS/HR: 0.9 INJECTION, SOLUTION INTRAVENOUS at 04:50

## 2020-11-27 RX ADMIN — PIPERACILLIN SODIUM AND TAZOBACTAM SODIUM SCH MLS/HR: .375; 3 INJECTION, POWDER, LYOPHILIZED, FOR SOLUTION INTRAVENOUS at 18:28

## 2020-11-27 RX ADMIN — PIPERACILLIN SODIUM AND TAZOBACTAM SODIUM SCH MLS/HR: .375; 3 INJECTION, POWDER, LYOPHILIZED, FOR SOLUTION INTRAVENOUS at 00:12

## 2020-11-27 RX ADMIN — PIPERACILLIN SODIUM AND TAZOBACTAM SODIUM SCH MLS/HR: .375; 3 INJECTION, POWDER, LYOPHILIZED, FOR SOLUTION INTRAVENOUS at 06:21

## 2020-11-27 RX ADMIN — ALBUTEROL SULFATE SCH MG: 2.5 SOLUTION RESPIRATORY (INHALATION) at 18:00

## 2020-11-27 RX ADMIN — PANTOPRAZOLE SODIUM SCH MG: 40 TABLET, DELAYED RELEASE ORAL at 22:00

## 2020-11-27 RX ADMIN — PIPERACILLIN SODIUM AND TAZOBACTAM SODIUM SCH MLS/HR: .375; 3 INJECTION, POWDER, LYOPHILIZED, FOR SOLUTION INTRAVENOUS at 12:30

## 2020-11-27 RX ADMIN — VITAMIN D, TAB 1000IU (100/BT) SCH UNIT: 25 TAB at 09:40

## 2020-11-27 RX ADMIN — ALBUTEROL SULFATE SCH MG: 2.5 SOLUTION RESPIRATORY (INHALATION) at 14:20

## 2020-11-27 RX ADMIN — SODIUM CHLORIDE SCH MLS/HR: 0.9 INJECTION, SOLUTION INTRAVENOUS at 18:10

## 2020-11-28 LAB
ALBUMIN SERPL-MCNC: 2.2 G/DL (ref 3.4–5)
ALP SERPL-CCNC: 70 U/L (ref 45–117)
ALP SERPL-CCNC: 70 U/L (ref 45–117)
ALT SERPL-CCNC: 16 U/L (ref 16–61)
ALT SERPL-CCNC: 16 U/L (ref 16–61)
ANION GAP SERPL CALCULATED.3IONS-SCNC: 10 MMOL/L (ref 5–15)
BILIRUB DIRECT SERPL-MCNC: 0.3 MG/DL (ref 0–0.2)
BILIRUB SERPL-MCNC: 0.7 MG/DL (ref 0.2–1)
BILIRUB SERPL-MCNC: 0.7 MG/DL (ref 0.2–1)
BUN SERPL-MCNC: 81 MG/DL (ref 7–18)
BUN/CREAT SERPL: 14.5
CALCIUM SERPL-MCNC: 7.7 MG/DL (ref 8.5–10.1)
CHLORIDE SERPL-SCNC: 106 MMOL/L (ref 98–107)
CO2 SERPL-SCNC: 20 MMOL/L (ref 21–32)
GLUCOSE SERPL-MCNC: 126 MG/DL (ref 74–106)
HCT VFR BLD AUTO: 37.6 % (ref 41–53)
HGB BLD-MCNC: 12.3 G/DL (ref 13.5–17.5)
MAGNESIUM SERPL-MCNC: 2 MG/DL (ref 1.6–2.6)
MCH RBC QN AUTO: 28.1 PG (ref 28–32)
MCV RBC AUTO: 86 FL (ref 80–100)
NRBC BLD QL AUTO: 1 %
POTASSIUM SERPL-SCNC: 3.6 MMOL/L (ref 3.5–5.1)
PROT SERPL-MCNC: 5.8 G/DL (ref 6.4–8.2)
PROT SERPL-MCNC: 6.5 G/DL (ref 6.4–8.2)
SODIUM SERPL-SCNC: 136 MMOL/L (ref 136–145)

## 2020-11-28 RX ADMIN — SODIUM CHLORIDE SCH MLS/HR: 0.9 INJECTION, SOLUTION INTRAVENOUS at 00:50

## 2020-11-28 RX ADMIN — PIPERACILLIN SODIUM AND TAZOBACTAM SODIUM SCH MLS/HR: .375; 3 INJECTION, POWDER, LYOPHILIZED, FOR SOLUTION INTRAVENOUS at 12:11

## 2020-11-28 RX ADMIN — DONEPEZIL HYDROCHLORIDE SCH MG: 5 TABLET, FILM COATED ORAL at 22:00

## 2020-11-28 RX ADMIN — PIPERACILLIN SODIUM AND TAZOBACTAM SODIUM SCH MLS/HR: .375; 3 INJECTION, POWDER, LYOPHILIZED, FOR SOLUTION INTRAVENOUS at 18:10

## 2020-11-28 RX ADMIN — SODIUM CHLORIDE SCH MLS/HR: 0.9 INJECTION, SOLUTION INTRAVENOUS at 14:13

## 2020-11-28 RX ADMIN — PIPERACILLIN SODIUM AND TAZOBACTAM SODIUM SCH MLS/HR: .375; 3 INJECTION, POWDER, LYOPHILIZED, FOR SOLUTION INTRAVENOUS at 00:00

## 2020-11-28 RX ADMIN — PANTOPRAZOLE SODIUM SCH MG: 40 TABLET, DELAYED RELEASE ORAL at 08:44

## 2020-11-28 RX ADMIN — ATORVASTATIN CALCIUM SCH MG: 20 TABLET, FILM COATED ORAL at 22:00

## 2020-11-28 RX ADMIN — ALBUTEROL SULFATE SCH MG: 2.5 SOLUTION RESPIRATORY (INHALATION) at 14:39

## 2020-11-28 RX ADMIN — PIPERACILLIN SODIUM AND TAZOBACTAM SODIUM SCH MLS/HR: .375; 3 INJECTION, POWDER, LYOPHILIZED, FOR SOLUTION INTRAVENOUS at 06:00

## 2020-11-28 RX ADMIN — PANTOPRAZOLE SODIUM SCH MG: 40 TABLET, DELAYED RELEASE ORAL at 22:00

## 2020-11-28 RX ADMIN — SODIUM CHLORIDE SCH MLS/HR: 0.9 INJECTION, SOLUTION INTRAVENOUS at 07:30

## 2020-11-28 RX ADMIN — ALBUTEROL SULFATE SCH MG: 2.5 SOLUTION RESPIRATORY (INHALATION) at 02:00

## 2020-11-28 RX ADMIN — VITAMIN D, TAB 1000IU (100/BT) SCH UNIT: 25 TAB at 08:44

## 2020-11-29 VITALS — DIASTOLIC BLOOD PRESSURE: 57 MMHG | SYSTOLIC BLOOD PRESSURE: 111 MMHG

## 2020-11-29 LAB
ANION GAP SERPL CALCULATED.3IONS-SCNC: 10 MMOL/L (ref 5–15)
BUN SERPL-MCNC: 78 MG/DL (ref 7–18)
BUN/CREAT SERPL: 15.6
CALCIUM SERPL-MCNC: 8 MG/DL (ref 8.5–10.1)
CHLORIDE SERPL-SCNC: 113 MMOL/L (ref 98–107)
CO2 SERPL-SCNC: 17 MMOL/L (ref 21–32)
GLUCOSE SERPL-MCNC: 70 MG/DL (ref 74–106)
HCT VFR BLD AUTO: 39.1 % (ref 41–53)
HGB BLD-MCNC: 12.8 G/DL (ref 13.5–17.5)
MCH RBC QN AUTO: 28.1 PG (ref 28–32)
MCV RBC AUTO: 85.9 FL (ref 80–100)
NRBC BLD QL AUTO: 0.1 %
POTASSIUM SERPL-SCNC: 3.9 MMOL/L (ref 3.5–5.1)
SODIUM SERPL-SCNC: 140 MMOL/L (ref 136–145)

## 2020-11-29 RX ADMIN — PIPERACILLIN SODIUM AND TAZOBACTAM SODIUM SCH MLS/HR: .375; 3 INJECTION, POWDER, LYOPHILIZED, FOR SOLUTION INTRAVENOUS at 08:15

## 2020-11-29 RX ADMIN — PANTOPRAZOLE SODIUM SCH MG: 40 TABLET, DELAYED RELEASE ORAL at 08:22

## 2020-11-29 RX ADMIN — ATORVASTATIN CALCIUM SCH MG: 20 TABLET, FILM COATED ORAL at 22:20

## 2020-11-29 RX ADMIN — DONEPEZIL HYDROCHLORIDE SCH MG: 5 TABLET, FILM COATED ORAL at 22:20

## 2020-11-29 RX ADMIN — TAMSULOSIN HYDROCHLORIDE SCH MG: 0.4 CAPSULE ORAL at 19:11

## 2020-11-29 RX ADMIN — PANTOPRAZOLE SODIUM SCH MG: 40 TABLET, DELAYED RELEASE ORAL at 22:20

## 2020-11-29 RX ADMIN — PIPERACILLIN SODIUM AND TAZOBACTAM SODIUM SCH MLS/HR: .375; 3 INJECTION, POWDER, LYOPHILIZED, FOR SOLUTION INTRAVENOUS at 11:57

## 2020-11-29 RX ADMIN — LOSARTAN POTASSIUM SCH MG: 25 TABLET, FILM COATED ORAL at 08:21

## 2020-11-29 RX ADMIN — MEROPENEM SCH MLS/HR: 500 INJECTION INTRAVENOUS at 22:20

## 2020-11-29 RX ADMIN — VITAMIN D, TAB 1000IU (100/BT) SCH UNIT: 25 TAB at 10:00

## 2020-11-29 RX ADMIN — PIPERACILLIN SODIUM AND TAZOBACTAM SODIUM SCH MLS/HR: .375; 3 INJECTION, POWDER, LYOPHILIZED, FOR SOLUTION INTRAVENOUS at 01:18

## 2020-11-29 NOTE — NUR
WOUND CARE NOTE: IN TO SEE PATIENT TODAY PER WOUND CARE CONSULT REQUEST. PATIENT ADMITTED TO 
[facility] WITH DIAGNOSIS OF SEPSIS, UTI. CURRENT DANO SCORE IS ASSESSED AT 12. PATIENT IS S/P 
CVA, WITH CONTRACTURE OF LEFT UPPER EXTREMITY. HE IS RESTING ON SPECIALTY AIR MATTRESS IN 
ER. PATIENT IS TELE STATUS, WAITING FOR AVAILABLE ROOM ON TELE FLOOR. PATIENT IS NOTED TO 
HAVE MASD WITH SKIN EROSION TO THE POSTERIOR SCROTUM AND INTRAGLUTEAL FOLD SACRUM.  WOUND 
PHOTOS TAKEN AT THIS TIME FOR REFERENCE.  NO OTHER SKIN INTEGRITY ISSUES SEEN AT THIS TIME.



RECOMMEND: FREQUENT TURN SCHEDULE Q 2 HOURS, PRN, AS CONDITION PERMITS, WITH PRESSURE 
REDISTRIBUTION, USING PILLOWS/WEDGES. BID/PRN APPLICATION WITH ZGUARD AND XEROFORM TO 
POSTERIOR SCROTUM, ZGUARD AND OPTIFOAM GENTLE SACRAL DRESSING, SPECIALTY AIR MATTRESS, 
DIETARY CONSULT, SKIN/WOUND CARE PLAN, CONTINUED MONITORING BY WOUND CARE TEAM. 

-------------------------------------------------------------------------------

Addendum: 11/29/20 at 1846 by Alicia Coleman RN

-------------------------------------------------------------------------------

Amended: Links added.

## 2020-11-29 NOTE — NUR
Telemetry admit from ELSA CUENCA admitted to Telemetry unit after SBAR received.  Patient oriented to Jamey Hawley 
primary RN, unit, room 273, bed B, and unit policies regarding patient care and visiting 
hours. Patient now on continuous telemetry monitoring, tele box #49 and telemetry reading on 
arrival to unit is SR 87. Patient placed on bedside oxygen, weighed by bedscale and 
encouraged to call if they need something. All questions and concerns addressed, patient 
verbalized understanding.

## 2020-11-29 NOTE — NUR
Nutrition Assessment Notes



Please refer to link for full assessment notes.



Est Energy needs: 1597-6469 kcals (20-23 kcal/kgBW)

Est Protein needs: 58-65 gms/day (0.8-0.9 gm/kgBW) d/t pt elev RFTs



Will continue to monitor and reassess prn.

-------------------------------------------------------------------------------

Addendum: 11/29/20 at 1250 by Dyan Sosa RD

-------------------------------------------------------------------------------

Amended: Links added.

-------------------------------------------------------------------------------

Addendum: 11/30/20 at 1131 by ASHLEY BARRERA RD

-------------------------------------------------------------------------------

Pt is a consult for wounds



Pt noted to have  BS score of 15 per Rn note.  pt with skin tear on sacrum, redness on 
scrotum. 



Consider adding MVI and Vitamin C 500 mg BID

## 2020-11-30 VITALS — SYSTOLIC BLOOD PRESSURE: 144 MMHG | DIASTOLIC BLOOD PRESSURE: 68 MMHG

## 2020-11-30 VITALS — DIASTOLIC BLOOD PRESSURE: 64 MMHG | SYSTOLIC BLOOD PRESSURE: 154 MMHG

## 2020-11-30 VITALS — DIASTOLIC BLOOD PRESSURE: 62 MMHG | SYSTOLIC BLOOD PRESSURE: 109 MMHG

## 2020-11-30 VITALS — DIASTOLIC BLOOD PRESSURE: 50 MMHG | SYSTOLIC BLOOD PRESSURE: 109 MMHG

## 2020-11-30 VITALS — DIASTOLIC BLOOD PRESSURE: 57 MMHG | SYSTOLIC BLOOD PRESSURE: 111 MMHG

## 2020-11-30 VITALS — DIASTOLIC BLOOD PRESSURE: 61 MMHG | SYSTOLIC BLOOD PRESSURE: 121 MMHG

## 2020-11-30 LAB
ANION GAP SERPL CALCULATED.3IONS-SCNC: 11 MMOL/L (ref 5–15)
BUN SERPL-MCNC: 77 MG/DL (ref 7–18)
BUN/CREAT SERPL: 16
CALCIUM SERPL-MCNC: 8 MG/DL (ref 8.5–10.1)
CHLORIDE SERPL-SCNC: 114 MMOL/L (ref 98–107)
CO2 SERPL-SCNC: 16 MMOL/L (ref 21–32)
GLUCOSE SERPL-MCNC: 90 MG/DL (ref 74–106)
HCT VFR BLD AUTO: 35 % (ref 41–53)
HGB BLD-MCNC: 11.5 G/DL (ref 13.5–17.5)
MCH RBC QN AUTO: 27.9 PG (ref 28–32)
MCV RBC AUTO: 85 FL (ref 80–100)
POTASSIUM SERPL-SCNC: 3.6 MMOL/L (ref 3.5–5.1)
SODIUM SERPL-SCNC: 141 MMOL/L (ref 136–145)

## 2020-11-30 RX ADMIN — DONEPEZIL HYDROCHLORIDE SCH MG: 5 TABLET, FILM COATED ORAL at 22:56

## 2020-11-30 RX ADMIN — HYDROCODONE BITARTRATE AND ACETAMINOPHEN PRN TAB: 5; 325 TABLET ORAL at 17:16

## 2020-11-30 RX ADMIN — MEROPENEM SCH MLS/HR: 500 INJECTION INTRAVENOUS at 22:57

## 2020-11-30 RX ADMIN — PANTOPRAZOLE SODIUM SCH MG: 40 TABLET, DELAYED RELEASE ORAL at 10:04

## 2020-11-30 RX ADMIN — VITAMIN D, TAB 1000IU (100/BT) SCH UNIT: 25 TAB at 10:35

## 2020-11-30 RX ADMIN — TAMSULOSIN HYDROCHLORIDE SCH MG: 0.4 CAPSULE ORAL at 17:16

## 2020-11-30 RX ADMIN — DEXTROSE SCH MLS/HR: 5 SOLUTION INTRAVENOUS at 12:32

## 2020-11-30 RX ADMIN — MEROPENEM SCH MLS/HR: 500 INJECTION INTRAVENOUS at 10:35

## 2020-11-30 RX ADMIN — ATORVASTATIN CALCIUM SCH MG: 20 TABLET, FILM COATED ORAL at 22:56

## 2020-11-30 RX ADMIN — LOSARTAN POTASSIUM SCH MG: 25 TABLET, FILM COATED ORAL at 13:49

## 2020-11-30 RX ADMIN — PANTOPRAZOLE SODIUM SCH MG: 40 TABLET, DELAYED RELEASE ORAL at 22:56

## 2020-11-30 NOTE — NUR
Opening Shift Note



Assumed care of patient. Patient oriented to self and place. No S/S of distress/SOB or pain. 
Patient on 2L NC. Patient has a aly catheter draining and hung below the bladder. Safety 
measures maintained by keeping the bed locked in lowest position, 2 side rails up, personal 
items and call light within reach. Instructed on POC and to call for assist PRN, will 
continue to monitor for changes Q1hr and PRN.

## 2020-12-01 VITALS — SYSTOLIC BLOOD PRESSURE: 147 MMHG | DIASTOLIC BLOOD PRESSURE: 91 MMHG

## 2020-12-01 VITALS — SYSTOLIC BLOOD PRESSURE: 155 MMHG | DIASTOLIC BLOOD PRESSURE: 69 MMHG

## 2020-12-01 VITALS — SYSTOLIC BLOOD PRESSURE: 127 MMHG | DIASTOLIC BLOOD PRESSURE: 64 MMHG

## 2020-12-01 VITALS — SYSTOLIC BLOOD PRESSURE: 141 MMHG | DIASTOLIC BLOOD PRESSURE: 77 MMHG

## 2020-12-01 VITALS — SYSTOLIC BLOOD PRESSURE: 142 MMHG | DIASTOLIC BLOOD PRESSURE: 68 MMHG

## 2020-12-01 LAB
ALBUMIN SERPL-MCNC: 2 G/DL (ref 3.4–5)
ALP SERPL-CCNC: 64 U/L (ref 45–117)
ALT SERPL-CCNC: 15 U/L (ref 16–61)
ANION GAP SERPL CALCULATED.3IONS-SCNC: 12 MMOL/L (ref 5–15)
BILIRUB SERPL-MCNC: 0.4 MG/DL (ref 0.2–1)
BUN SERPL-MCNC: 71 MG/DL (ref 7–18)
BUN/CREAT SERPL: 16.4
CALCIUM SERPL-MCNC: 8.5 MG/DL (ref 8.5–10.1)
CHLORIDE SERPL-SCNC: 114 MMOL/L (ref 98–107)
CO2 SERPL-SCNC: 17 MMOL/L (ref 21–32)
GLUCOSE SERPL-MCNC: 79 MG/DL (ref 74–106)
POTASSIUM SERPL-SCNC: 3.4 MMOL/L (ref 3.5–5.1)
PROT SERPL-MCNC: 6.2 G/DL (ref 6.4–8.2)
SODIUM SERPL-SCNC: 143 MMOL/L (ref 136–145)

## 2020-12-01 RX ADMIN — HYDROCODONE BITARTRATE AND ACETAMINOPHEN PRN TAB: 5; 325 TABLET ORAL at 13:55

## 2020-12-01 RX ADMIN — MEROPENEM SCH MLS/HR: 500 INJECTION INTRAVENOUS at 10:23

## 2020-12-01 RX ADMIN — VITAMIN D, TAB 1000IU (100/BT) SCH UNIT: 25 TAB at 10:24

## 2020-12-01 RX ADMIN — PANTOPRAZOLE SODIUM SCH MG: 40 TABLET, DELAYED RELEASE ORAL at 22:40

## 2020-12-01 RX ADMIN — MEROPENEM SCH MLS/HR: 500 INJECTION INTRAVENOUS at 22:40

## 2020-12-01 RX ADMIN — DEXTROSE SCH MLS/HR: 5 SOLUTION INTRAVENOUS at 00:23

## 2020-12-01 RX ADMIN — DEXTROSE SCH MLS/HR: 5 SOLUTION INTRAVENOUS at 13:31

## 2020-12-01 RX ADMIN — ATORVASTATIN CALCIUM SCH MG: 20 TABLET, FILM COATED ORAL at 22:40

## 2020-12-01 RX ADMIN — DONEPEZIL HYDROCHLORIDE SCH MG: 5 TABLET, FILM COATED ORAL at 22:40

## 2020-12-01 RX ADMIN — LOSARTAN POTASSIUM SCH MG: 25 TABLET, FILM COATED ORAL at 10:24

## 2020-12-01 RX ADMIN — PANTOPRAZOLE SODIUM SCH MG: 40 TABLET, DELAYED RELEASE ORAL at 10:24

## 2020-12-01 RX ADMIN — TAMSULOSIN HYDROCHLORIDE SCH MG: 0.4 CAPSULE ORAL at 17:12

## 2020-12-01 NOTE — NUR
Ordered medication given. Patient complained of 4/6 abdominal pain. Will medicate for pain. 

-------------------------------------------------------------------------------

Addendum: 12/01/20 at 1358 by AUSTIN DAVEY RN RN

-------------------------------------------------------------------------------

Pan medication given. Patient stable at this time. 

-------------------------------------------------------------------------------

Addendum: 12/01/20 at 1359 by AUSTIN DAVEY RN RN

-------------------------------------------------------------------------------

PAIN medication given.

## 2020-12-01 NOTE — NUR
Patient resting quietly in bed with no distress noted; denies any pain at this time. Patient 
stable.

## 2020-12-01 NOTE — NUR
Opening Shift Note





Assumed care of patient, awake and alert. Patient laying in bed. No S/S of distress/SOB or 
pain. Safety measures maintained by keeping the bed locked in lowest position, 2 side rails 
up, personal items and call light within reach. Instructed on POC and to call for assist 
PRN, will continue to monitor for changes Q1hr and PRN.

## 2020-12-02 VITALS — SYSTOLIC BLOOD PRESSURE: 125 MMHG | DIASTOLIC BLOOD PRESSURE: 55 MMHG

## 2020-12-02 VITALS — DIASTOLIC BLOOD PRESSURE: 68 MMHG | SYSTOLIC BLOOD PRESSURE: 155 MMHG

## 2020-12-02 VITALS — DIASTOLIC BLOOD PRESSURE: 68 MMHG | SYSTOLIC BLOOD PRESSURE: 128 MMHG

## 2020-12-02 VITALS — SYSTOLIC BLOOD PRESSURE: 108 MMHG | DIASTOLIC BLOOD PRESSURE: 65 MMHG

## 2020-12-02 VITALS — DIASTOLIC BLOOD PRESSURE: 87 MMHG | SYSTOLIC BLOOD PRESSURE: 150 MMHG

## 2020-12-02 VITALS — SYSTOLIC BLOOD PRESSURE: 108 MMHG | DIASTOLIC BLOOD PRESSURE: 68 MMHG

## 2020-12-02 LAB
ALBUMIN SERPL-MCNC: 1.7 G/DL (ref 3.4–5)
ALP SERPL-CCNC: 58 U/L (ref 45–117)
ALT SERPL-CCNC: 13 U/L (ref 16–61)
ANION GAP SERPL CALCULATED.3IONS-SCNC: 8 MMOL/L (ref 5–15)
BILIRUB SERPL-MCNC: 0.4 MG/DL (ref 0.2–1)
BUN SERPL-MCNC: 60 MG/DL (ref 7–18)
BUN/CREAT SERPL: 17
CALCIUM SERPL-MCNC: 8 MG/DL (ref 8.5–10.1)
CHLORIDE SERPL-SCNC: 115 MMOL/L (ref 98–107)
CO2 SERPL-SCNC: 20 MMOL/L (ref 21–32)
GLUCOSE SERPL-MCNC: 110 MG/DL (ref 74–106)
POTASSIUM SERPL-SCNC: 3.7 MMOL/L (ref 3.5–5.1)
PROT SERPL-MCNC: 5.5 G/DL (ref 6.4–8.2)
SODIUM SERPL-SCNC: 143 MMOL/L (ref 136–145)

## 2020-12-02 RX ADMIN — HYDROCODONE BITARTRATE AND ACETAMINOPHEN PRN TAB: 5; 325 TABLET ORAL at 14:08

## 2020-12-02 RX ADMIN — HYDROCODONE BITARTRATE AND ACETAMINOPHEN PRN TAB: 5; 325 TABLET ORAL at 04:28

## 2020-12-02 RX ADMIN — PANTOPRAZOLE SODIUM SCH MG: 40 TABLET, DELAYED RELEASE ORAL at 09:02

## 2020-12-02 RX ADMIN — VITAMIN D, TAB 1000IU (100/BT) SCH UNIT: 25 TAB at 09:03

## 2020-12-02 RX ADMIN — DONEPEZIL HYDROCHLORIDE SCH MG: 5 TABLET, FILM COATED ORAL at 21:39

## 2020-12-02 RX ADMIN — LOSARTAN POTASSIUM SCH MG: 25 TABLET, FILM COATED ORAL at 09:03

## 2020-12-02 RX ADMIN — DEXTROSE SCH MLS/HR: 5 SOLUTION INTRAVENOUS at 20:13

## 2020-12-02 RX ADMIN — PANTOPRAZOLE SODIUM SCH MG: 40 TABLET, DELAYED RELEASE ORAL at 21:39

## 2020-12-02 RX ADMIN — ATORVASTATIN CALCIUM SCH MG: 20 TABLET, FILM COATED ORAL at 21:39

## 2020-12-02 RX ADMIN — TAMSULOSIN HYDROCHLORIDE SCH MG: 0.4 CAPSULE ORAL at 17:41

## 2020-12-02 RX ADMIN — MEROPENEM SCH MLS/HR: 500 INJECTION INTRAVENOUS at 21:39

## 2020-12-02 RX ADMIN — DEXTROSE SCH MLS/HR: 5 SOLUTION INTRAVENOUS at 02:39

## 2020-12-02 RX ADMIN — MEROPENEM SCH MLS/HR: 500 INJECTION INTRAVENOUS at 09:04

## 2020-12-02 NOTE — NUR
Patient resting comfortably in bed with no distress noted at this time. Patient stable 
throughout shift.

## 2020-12-02 NOTE — NUR
Nutrition Followup Notes



Pt wt is 77.2 kg



Pt was sleeping wih no relatives at bedside when rounded this morning.  Pt is with a Cardiac 
diet, appetite is poor aeb ave 42% x3 PO intake per RN doc. Per RN note, pt is with no s/s 
of distress or pain.



Est Energy needs: 6212-6817 kcals (20-23 kcal/kgBW)

Est Protein needs: 58-65 gms/day (0.8-0.9 gm/kgBW) d/t pt elev RFTs



Will continue to monitor and reassess prn.



LABS:  BUN 20 H, Creat 3.52 H, Gluc 110 H, Ca 8.0 L, Alb 1.7 L



GI: Pt had 1 BM on 11/30 per RN doc.



BS: 12 high risk.  Refer to wound assessment report for further details.



PES:

1) Altered nutrition related lab values r/t current/chronic medical condition aeb elev RFTs, 
hypoglycemia, hypocalcemia, hypoalbuminemia



Comments 

Will continue to monitor PO status, skin status, pertinent labs and weight trends. Will f/u 
in 3-5 days

1) Continue to closely monitor pt PO intake to meet at least 75% of meals

2) If albumin continues trending down with improved RFTs, consider Prostat 1 pkt BID

3) Continue current plan of care

## 2020-12-03 VITALS — SYSTOLIC BLOOD PRESSURE: 105 MMHG | DIASTOLIC BLOOD PRESSURE: 70 MMHG

## 2020-12-03 VITALS — SYSTOLIC BLOOD PRESSURE: 110 MMHG | DIASTOLIC BLOOD PRESSURE: 77 MMHG

## 2020-12-03 VITALS — SYSTOLIC BLOOD PRESSURE: 133 MMHG | DIASTOLIC BLOOD PRESSURE: 68 MMHG

## 2020-12-03 VITALS — DIASTOLIC BLOOD PRESSURE: 70 MMHG | SYSTOLIC BLOOD PRESSURE: 105 MMHG

## 2020-12-03 VITALS — DIASTOLIC BLOOD PRESSURE: 63 MMHG | SYSTOLIC BLOOD PRESSURE: 108 MMHG

## 2020-12-03 VITALS — DIASTOLIC BLOOD PRESSURE: 80 MMHG | SYSTOLIC BLOOD PRESSURE: 144 MMHG

## 2020-12-03 LAB
ALBUMIN SERPL-MCNC: 1.8 G/DL (ref 3.4–5)
ALP SERPL-CCNC: 64 U/L (ref 45–117)
ALT SERPL-CCNC: 12 U/L (ref 16–61)
ANION GAP SERPL CALCULATED.3IONS-SCNC: 11 MMOL/L (ref 5–15)
BILIRUB SERPL-MCNC: 0.4 MG/DL (ref 0.2–1)
BUN SERPL-MCNC: 53 MG/DL (ref 7–18)
BUN/CREAT SERPL: 18.5
CALCIUM SERPL-MCNC: 8.4 MG/DL (ref 8.5–10.1)
CHLORIDE SERPL-SCNC: 114 MMOL/L (ref 98–107)
CO2 SERPL-SCNC: 19 MMOL/L (ref 21–32)
GLUCOSE SERPL-MCNC: 87 MG/DL (ref 74–106)
POTASSIUM SERPL-SCNC: 3.5 MMOL/L (ref 3.5–5.1)
PROT SERPL-MCNC: 5.9 G/DL (ref 6.4–8.2)
SODIUM SERPL-SCNC: 144 MMOL/L (ref 136–145)

## 2020-12-03 RX ADMIN — PANTOPRAZOLE SODIUM SCH MG: 40 TABLET, DELAYED RELEASE ORAL at 21:36

## 2020-12-03 RX ADMIN — MEROPENEM SCH MLS/HR: 500 INJECTION INTRAVENOUS at 21:36

## 2020-12-03 RX ADMIN — DEXTROSE SCH MLS/HR: 5 SOLUTION INTRAVENOUS at 04:55

## 2020-12-03 RX ADMIN — TAMSULOSIN HYDROCHLORIDE SCH MG: 0.4 CAPSULE ORAL at 17:08

## 2020-12-03 RX ADMIN — DEXTROSE SCH MLS/HR: 5 SOLUTION INTRAVENOUS at 15:13

## 2020-12-03 RX ADMIN — LOSARTAN POTASSIUM SCH MG: 25 TABLET, FILM COATED ORAL at 09:48

## 2020-12-03 RX ADMIN — PANTOPRAZOLE SODIUM SCH MG: 40 TABLET, DELAYED RELEASE ORAL at 09:47

## 2020-12-03 RX ADMIN — HYDROCODONE BITARTRATE AND ACETAMINOPHEN PRN TAB: 5; 325 TABLET ORAL at 12:14

## 2020-12-03 RX ADMIN — ATORVASTATIN CALCIUM SCH MG: 20 TABLET, FILM COATED ORAL at 21:36

## 2020-12-03 RX ADMIN — MEROPENEM SCH MLS/HR: 500 INJECTION INTRAVENOUS at 09:48

## 2020-12-03 RX ADMIN — VITAMIN D, TAB 1000IU (100/BT) SCH UNIT: 25 TAB at 09:48

## 2020-12-03 NOTE — NUR
Ordered medication given. Patient complaint of pain in right flank; will medicate. Patient 
stable. 

-------------------------------------------------------------------------------

Addendum: 12/03/20 at 1217 by AUSTIN DAVEY RN RN

-------------------------------------------------------------------------------

Patient medicated for 6/10 right flank pain.

## 2020-12-03 NOTE — NUR
Patient resting comfortably in bed with eyes closed; no distress noted. Patient stable at 
this time.

## 2020-12-03 NOTE — NUR
Scheduled medications given per order. Patient resting quietly in bed with no distress noted 
at this time. Patient stable.

## 2020-12-04 VITALS — SYSTOLIC BLOOD PRESSURE: 131 MMHG | DIASTOLIC BLOOD PRESSURE: 74 MMHG

## 2020-12-04 VITALS — SYSTOLIC BLOOD PRESSURE: 130 MMHG | DIASTOLIC BLOOD PRESSURE: 71 MMHG

## 2020-12-04 VITALS — DIASTOLIC BLOOD PRESSURE: 79 MMHG | SYSTOLIC BLOOD PRESSURE: 128 MMHG

## 2020-12-04 VITALS — DIASTOLIC BLOOD PRESSURE: 74 MMHG | SYSTOLIC BLOOD PRESSURE: 131 MMHG

## 2020-12-04 VITALS — SYSTOLIC BLOOD PRESSURE: 126 MMHG | DIASTOLIC BLOOD PRESSURE: 63 MMHG

## 2020-12-04 VITALS — SYSTOLIC BLOOD PRESSURE: 141 MMHG | DIASTOLIC BLOOD PRESSURE: 81 MMHG

## 2020-12-04 LAB
ALBUMIN SERPL-MCNC: 1.9 G/DL (ref 3.4–5)
ALP SERPL-CCNC: 63 U/L (ref 45–117)
ALT SERPL-CCNC: 13 U/L (ref 16–61)
ANION GAP SERPL CALCULATED.3IONS-SCNC: 6 MMOL/L (ref 5–15)
BILIRUB SERPL-MCNC: 0.4 MG/DL (ref 0.2–1)
BUN SERPL-MCNC: 44 MG/DL (ref 7–18)
BUN/CREAT SERPL: 19.3
CALCIUM SERPL-MCNC: 8.5 MG/DL (ref 8.5–10.1)
CHLORIDE SERPL-SCNC: 114 MMOL/L (ref 98–107)
CO2 SERPL-SCNC: 25 MMOL/L (ref 21–32)
GLUCOSE SERPL-MCNC: 108 MG/DL (ref 74–106)
POTASSIUM SERPL-SCNC: 3.6 MMOL/L (ref 3.5–5.1)
PROT SERPL-MCNC: 6 G/DL (ref 6.4–8.2)
SODIUM SERPL-SCNC: 145 MMOL/L (ref 136–145)

## 2020-12-04 RX ADMIN — TAMSULOSIN HYDROCHLORIDE SCH MG: 0.4 CAPSULE ORAL at 17:49

## 2020-12-04 RX ADMIN — DEXTROSE SCH MLS/HR: 5 SOLUTION INTRAVENOUS at 04:02

## 2020-12-04 RX ADMIN — MEROPENEM SCH MLS/HR: 500 INJECTION INTRAVENOUS at 09:51

## 2020-12-04 RX ADMIN — PANTOPRAZOLE SODIUM SCH MG: 40 TABLET, DELAYED RELEASE ORAL at 22:19

## 2020-12-04 RX ADMIN — HYDROCODONE BITARTRATE AND ACETAMINOPHEN PRN TAB: 5; 325 TABLET ORAL at 01:59

## 2020-12-04 RX ADMIN — ATORVASTATIN CALCIUM SCH MG: 20 TABLET, FILM COATED ORAL at 22:19

## 2020-12-04 RX ADMIN — VITAMIN D, TAB 1000IU (100/BT) SCH UNIT: 25 TAB at 09:55

## 2020-12-04 RX ADMIN — MEROPENEM SCH MLS/HR: 500 INJECTION INTRAVENOUS at 22:31

## 2020-12-04 RX ADMIN — PANTOPRAZOLE SODIUM SCH MG: 40 TABLET, DELAYED RELEASE ORAL at 09:55

## 2020-12-04 RX ADMIN — LOSARTAN POTASSIUM SCH MG: 25 TABLET, FILM COATED ORAL at 09:54

## 2020-12-04 RX ADMIN — SODIUM CHLORIDE SCH MLS/HR: 4.5 INJECTION, SOLUTION INTRAVENOUS at 13:31

## 2020-12-04 NOTE — NUR
Assessment 



Patient is a 70-year-old male. Unable to speak to patient. Assessment was completed with 
patient cousin Jamal Ph: [phone]. Prior to admission patient reside home with 
Jamal and functioned with assistance. Patient has a hospital bed, wheelchair and bedside 
commode. Jamal is patient caregiver and assist patient with his ADL's. Advised Jamal 
there is a socials service consult for home health for IV abx for 2 weeks. Informed Jamal Nash will completed order once patient is ready to discharge. Per Jamal he will be able 
to assist with the IV anx on the day the home health is unavailable. Informed Jamal odalis has 
the right to participate in all discharge planning. Jamal verbalized understanding and 
agreed to discharge plan. 

-------------------------------------------------------------------------------

Addendum: 12/04/20 at 1535 by CHARLES ANDREW

-------------------------------------------------------------------------------

Amended: Links added.

## 2020-12-04 NOTE — NUR
Opening Shift Note

Assumed care of patient, awake and alert X2. Oriented to only name and location. No S/S of 
distress/SOB or pain. Patient is total care. Repositioned patient. Will continue to monitor 
for changes Q1hr and PRN.

## 2020-12-04 NOTE — NUR
ELEVATED TEMP



CNA reported a temp of 103.5. Initiated cooling temperatures with ice packs. Will continue 
to monitor 

-------------------------------------------------------------------------------

Addendum: 12/04/20 at 2048 by MARCELINA WILLIS RN RN

-------------------------------------------------------------------------------

*initiated cooling methods 

-------------------------------------------------------------------------------

Addendum: 12/04/20 at 2239 by MARCELINA WILLIS RN RN

-------------------------------------------------------------------------------

Temp. 101.4

## 2020-12-04 NOTE — NUR
I spoke with Jacklyn Ramirez NP regarding plan of care for this patient.  I let her know that 
family wants patient to go home upon discharge and they have someone who is teachable to 
help with home IV ATB.  I asked her to place more specific order for exactly what IV ATB and 
dose, frequency along with order for home health for IV ATB.  I called [facility] 
Manager Irish [phone] and left message asking for assistance with which infusion 
company to reach out to along with which home health agencies they are contracted with.  Per 
Irish's voicemail, [facility] after hours/weekend contact number is [phone].

## 2020-12-05 VITALS — SYSTOLIC BLOOD PRESSURE: 132 MMHG | DIASTOLIC BLOOD PRESSURE: 74 MMHG

## 2020-12-05 VITALS — DIASTOLIC BLOOD PRESSURE: 74 MMHG | SYSTOLIC BLOOD PRESSURE: 144 MMHG

## 2020-12-05 VITALS — DIASTOLIC BLOOD PRESSURE: 86 MMHG | SYSTOLIC BLOOD PRESSURE: 146 MMHG

## 2020-12-05 VITALS — DIASTOLIC BLOOD PRESSURE: 71 MMHG | SYSTOLIC BLOOD PRESSURE: 119 MMHG

## 2020-12-05 VITALS — DIASTOLIC BLOOD PRESSURE: 78 MMHG | SYSTOLIC BLOOD PRESSURE: 130 MMHG

## 2020-12-05 LAB
ALBUMIN SERPL-MCNC: 1.9 G/DL (ref 3.4–5)
ALP SERPL-CCNC: 61 U/L (ref 45–117)
ALT SERPL-CCNC: 16 U/L (ref 16–61)
ANION GAP SERPL CALCULATED.3IONS-SCNC: 10 MMOL/L (ref 5–15)
BILIRUB SERPL-MCNC: 0.4 MG/DL (ref 0.2–1)
BUN SERPL-MCNC: 30 MG/DL (ref 7–18)
BUN/CREAT SERPL: 17.6
CALCIUM SERPL-MCNC: 7.8 MG/DL (ref 8.5–10.1)
CHLORIDE SERPL-SCNC: 114 MMOL/L (ref 98–107)
CO2 SERPL-SCNC: 20 MMOL/L (ref 21–32)
GLUCOSE SERPL-MCNC: 86 MG/DL (ref 74–106)
HCT VFR BLD AUTO: 38.9 % (ref 41–53)
HGB BLD-MCNC: 11.6 G/DL (ref 13.5–17.5)
MAGNESIUM SERPL-MCNC: 1.9 MG/DL (ref 1.6–2.6)
MCH RBC QN AUTO: 27.5 PG (ref 28–32)
MCV RBC AUTO: 91.7 FL (ref 80–100)
NRBC BLD QL AUTO: 0 %
POTASSIUM SERPL-SCNC: 3.9 MMOL/L (ref 3.5–5.1)
PROT SERPL-MCNC: 5.5 G/DL (ref 6.4–8.2)
SODIUM SERPL-SCNC: 144 MMOL/L (ref 136–145)

## 2020-12-05 RX ADMIN — ATORVASTATIN CALCIUM SCH MG: 20 TABLET, FILM COATED ORAL at 21:39

## 2020-12-05 RX ADMIN — PANTOPRAZOLE SODIUM SCH MG: 40 TABLET, DELAYED RELEASE ORAL at 21:39

## 2020-12-05 RX ADMIN — VITAMIN D, TAB 1000IU (100/BT) SCH UNIT: 25 TAB at 09:41

## 2020-12-05 RX ADMIN — HYDROCODONE BITARTRATE AND ACETAMINOPHEN PRN TAB: 5; 325 TABLET ORAL at 09:42

## 2020-12-05 RX ADMIN — PANTOPRAZOLE SODIUM SCH MG: 40 TABLET, DELAYED RELEASE ORAL at 09:41

## 2020-12-05 RX ADMIN — DEXTROSE SCH MLS/HR: 5 SOLUTION INTRAVENOUS at 22:38

## 2020-12-05 RX ADMIN — MEROPENEM SCH MLS/HR: 500 INJECTION INTRAVENOUS at 21:39

## 2020-12-05 RX ADMIN — SODIUM CHLORIDE SCH MLS/HR: 4.5 INJECTION, SOLUTION INTRAVENOUS at 02:15

## 2020-12-05 RX ADMIN — MEROPENEM SCH MLS/HR: 500 INJECTION INTRAVENOUS at 09:40

## 2020-12-05 RX ADMIN — TAMSULOSIN HYDROCHLORIDE SCH MG: 0.4 CAPSULE ORAL at 17:02

## 2020-12-05 RX ADMIN — LOSARTAN POTASSIUM SCH MG: 25 TABLET, FILM COATED ORAL at 09:41

## 2020-12-05 RX ADMIN — DEXTROSE SCH MLS/HR: 5 SOLUTION INTRAVENOUS at 17:02

## 2020-12-05 RX ADMIN — HYDROCODONE BITARTRATE AND ACETAMINOPHEN PRN TAB: 5; 325 TABLET ORAL at 23:34

## 2020-12-05 NOTE — NUR
Nutrition Followup Notes



wt: 81.0 kg



Pt was sleeping with no family at bedside when rounded this morning.  Pt is currently on 
cardiac diet with inadequate PO of 50% x 3 per RN doc. pt with no distress noted



Est Energy needs: 5135-5722 kcals (20-23 kcal/kgBW), Est Protein needs: 58-65 gms/day 
(0.8-0.9 gm/kgBW) d/t pt elev RFTs. Will continue to monitor and reassess prn.



LABS: BUN 30 H CREAT 1.7 H CA 7.8 L, ALB 1.9 L



GI: Pt had 1 BM on 12/4 per RN doc.



BS: 15 mod risk.  Refer to wound assessment report for further details.



PES: 1) Altered nutrition related lab values r/t current/chronic medical condition aeb elev 
RFTs, hypoglycemia, hypocalcemia, hypoalbuminemia

     

Comments: Will continue to monitor PO status, skin status, pertinent labs and weight trends. 
Will f/u in 3-5 days

1) If albumin continues trending down with improved RFTs, consider Prostat 1 pkt BID. 2) 
consider ensure enlive 1 carton bid if PO is low. 3) Continue current plan of care

## 2020-12-05 NOTE — NUR
OPENING SHIFT NOTE

ASSUMED CARE OF PATIENT AWAKE AND ALERT X2. NO S/S OF DISTRESS NOTED. PATIENT IS BEDREST ON 
2L NC. VENCES IN PLACE, HUNG TO GRAVITY DRAINING CLEAR, YELLOW URINE. BED IS IN LOWEST, 
LOCKED POSITION WITH SIDE RAILS UP X2, AND BED ALARM ON FOR SAFETY. WILL CONTINUE TO MONITOR 
Q1H AND PRN.

## 2020-12-05 NOTE — NUR
Opening Shift Note

Assumed care of patient, awake and alert.  No S/S of distress/SOB or pain.  Instructed on 
POC and to callfor assist PRN, will continue to monitor for changes Q1hr and PRN.

## 2020-12-06 VITALS — SYSTOLIC BLOOD PRESSURE: 133 MMHG | DIASTOLIC BLOOD PRESSURE: 63 MMHG

## 2020-12-06 VITALS — DIASTOLIC BLOOD PRESSURE: 65 MMHG | SYSTOLIC BLOOD PRESSURE: 134 MMHG

## 2020-12-06 VITALS — DIASTOLIC BLOOD PRESSURE: 70 MMHG | SYSTOLIC BLOOD PRESSURE: 128 MMHG

## 2020-12-06 VITALS — DIASTOLIC BLOOD PRESSURE: 74 MMHG | SYSTOLIC BLOOD PRESSURE: 120 MMHG

## 2020-12-06 VITALS — DIASTOLIC BLOOD PRESSURE: 82 MMHG | SYSTOLIC BLOOD PRESSURE: 132 MMHG

## 2020-12-06 LAB
ALBUMIN SERPL-MCNC: 1.9 G/DL (ref 3.4–5)
ALP SERPL-CCNC: 81 U/L (ref 45–117)
ALT SERPL-CCNC: 15 U/L (ref 16–61)
ANION GAP SERPL CALCULATED.3IONS-SCNC: 6 MMOL/L (ref 5–15)
BILIRUB SERPL-MCNC: 0.4 MG/DL (ref 0.2–1)
BUN SERPL-MCNC: 23 MG/DL (ref 7–18)
BUN/CREAT SERPL: 15.4
CALCIUM SERPL-MCNC: 8.1 MG/DL (ref 8.5–10.1)
CHLORIDE SERPL-SCNC: 109 MMOL/L (ref 98–107)
CO2 SERPL-SCNC: 27 MMOL/L (ref 21–32)
GLUCOSE SERPL-MCNC: 120 MG/DL (ref 74–106)
HCT VFR BLD AUTO: 35.8 % (ref 41–53)
HGB BLD-MCNC: 11.8 G/DL (ref 13.5–17.5)
MCH RBC QN AUTO: 27.8 PG (ref 28–32)
MCV RBC AUTO: 84.5 FL (ref 80–100)
NRBC BLD QL AUTO: 0.1 %
POTASSIUM SERPL-SCNC: 3.4 MMOL/L (ref 3.5–5.1)
PROT SERPL-MCNC: 6.1 G/DL (ref 6.4–8.2)
SODIUM SERPL-SCNC: 142 MMOL/L (ref 136–145)

## 2020-12-06 RX ADMIN — DEXTROSE SCH MLS/HR: 5 SOLUTION INTRAVENOUS at 06:10

## 2020-12-06 RX ADMIN — HYDROCODONE BITARTRATE AND ACETAMINOPHEN PRN TAB: 5; 325 TABLET ORAL at 21:32

## 2020-12-06 RX ADMIN — TAMSULOSIN HYDROCHLORIDE SCH MG: 0.4 CAPSULE ORAL at 17:50

## 2020-12-06 RX ADMIN — MEROPENEM SCH MLS/HR: 500 INJECTION INTRAVENOUS at 10:11

## 2020-12-06 RX ADMIN — PANTOPRAZOLE SODIUM SCH MG: 40 TABLET, DELAYED RELEASE ORAL at 21:31

## 2020-12-06 RX ADMIN — LOSARTAN POTASSIUM SCH MG: 25 TABLET, FILM COATED ORAL at 10:11

## 2020-12-06 RX ADMIN — SODIUM CHLORIDE SCH MLS/HR: 4.5 INJECTION, SOLUTION INTRAVENOUS at 22:50

## 2020-12-06 RX ADMIN — PANTOPRAZOLE SODIUM SCH MG: 40 TABLET, DELAYED RELEASE ORAL at 10:12

## 2020-12-06 RX ADMIN — SODIUM CHLORIDE SCH MLS/HR: 4.5 INJECTION, SOLUTION INTRAVENOUS at 10:11

## 2020-12-06 RX ADMIN — ATORVASTATIN CALCIUM SCH MG: 20 TABLET, FILM COATED ORAL at 21:31

## 2020-12-06 RX ADMIN — VITAMIN D, TAB 1000IU (100/BT) SCH UNIT: 25 TAB at 10:12

## 2020-12-06 NOTE — NUR
PATIENT REPORTS HAVING PAIN HOWEVER, PT CANNOT RATE PAIN SCALE AND CANNOT STATE WHERE HIS 
PAIN LEVEL IS.  PATIENT DEMO'D INCREASE TONE TO R SIDE OF BODY. PT RECD PROM EXERCISES FOR 
BILAT UE'S AND LE'S X 10 REPS. PT WAS REPOSITION WITH PILLOWS AND LEFT CALL LIGHT WITHIN 
REACH.

-------------------------------------------------------------------------------

Addendum: 12/06/20 at 1455 by Caitlyn Zavala PT

-------------------------------------------------------------------------------

Amended: Links added.

## 2020-12-06 NOTE — NUR
BED BATH AND LINEN CHANGE

BED BATH PROVIDED TO PATIENT AS WELL AS COMPLETE LINEN CHANGE. PATIENT TOLERATED WELL. WILL 
CONTINUE CARE.

## 2020-12-06 NOTE — NUR
WOUND CARE NOTE:

Wound care in to see patient for reevaluation of skin integrity issue. Patient continue 
resting on air mattress in Rm. 273B. Patient is awake,alert and able to follow simple 
direction. He's in no stated pain at this time and he appears to be in no pain using Mccallum 
Baker Faces Pain Scale.  He needs assistance in turning and  repositioning and his Delvin 
score is 13. 



Skin assessment done with the assistance of patient's nurse, QUIN Colorado. Skin tear to 
patient's intragluteal fold is now resolved. His moisture related skin damage to sacrum and 
posterior scrotum is much improved.  Skin is intact,pink and blanchable. Staff reported 
patient is incontinent of soft stool, however staff frequently do eileen care/check and 
patient continue receiving BID/PRN cleaning and application of Barrier cream to sacrum.  No 
open wound noted, no pressure injury noted. Repositioned patient for comfort, redistributed 
pressure points with pillows. Patient tolerated well. Bed in low position, call bell within 
reach, bed alarm on.



RECOMMENDATION: Continuation of all wound care orders MD prescribed except application of 
Xeroform dressing to scrotum as wound is much improved; continue with skin/wound plan of 
care,continue monitoring by wound care while Delvin score is <18.

-------------------------------------------------------------------------------

Addendum: 12/06/20 at 1502 by Mercedes Dickinson RN

-------------------------------------------------------------------------------

Amended: Links added.

## 2020-12-06 NOTE — NUR
closing note'

endorsed care to QUIN [place]. Resting in bed. No sign of pain/distress at this time

## 2020-12-06 NOTE — NUR
8650 12/06/20 - Faxed to [facility] at [phone] face sheet, order for home IV ABx 
Erapenem  1 GM daily, H/P, labs, meds, discharge summary. Contacted by Selvin at [facility] who requested a anaphylactic kit or first dose to be given prior to discharge.  
Faxed to [facility] at [phone] and [facility] at [phone] 
pending review and accepting  agency.  Also faxed packe to [facility] [phone] 
requesting authorization.  Pending review and approved authorization.  Informed nurse 
Miroslava of above information.

## 2020-12-07 VITALS — DIASTOLIC BLOOD PRESSURE: 79 MMHG | SYSTOLIC BLOOD PRESSURE: 158 MMHG

## 2020-12-07 VITALS — DIASTOLIC BLOOD PRESSURE: 75 MMHG | SYSTOLIC BLOOD PRESSURE: 137 MMHG

## 2020-12-07 VITALS — SYSTOLIC BLOOD PRESSURE: 129 MMHG | DIASTOLIC BLOOD PRESSURE: 61 MMHG

## 2020-12-07 VITALS — SYSTOLIC BLOOD PRESSURE: 137 MMHG | DIASTOLIC BLOOD PRESSURE: 75 MMHG

## 2020-12-07 LAB
ALBUMIN SERPL-MCNC: 1.8 G/DL (ref 3.4–5)
ALP SERPL-CCNC: 66 U/L (ref 45–117)
ALT SERPL-CCNC: 13 U/L (ref 16–61)
ANION GAP SERPL CALCULATED.3IONS-SCNC: 9 MMOL/L (ref 5–15)
BILIRUB SERPL-MCNC: 0.4 MG/DL (ref 0.2–1)
BUN SERPL-MCNC: 18 MG/DL (ref 7–18)
BUN/CREAT SERPL: 13.6
CALCIUM SERPL-MCNC: 7.6 MG/DL (ref 8.5–10.1)
CHLORIDE SERPL-SCNC: 110 MMOL/L (ref 98–107)
CO2 SERPL-SCNC: 25 MMOL/L (ref 21–32)
GLUCOSE SERPL-MCNC: 93 MG/DL (ref 74–106)
POTASSIUM SERPL-SCNC: 3.1 MMOL/L (ref 3.5–5.1)
PROT SERPL-MCNC: 5.9 G/DL (ref 6.4–8.2)
SODIUM SERPL-SCNC: 144 MMOL/L (ref 136–145)

## 2020-12-07 RX ADMIN — SODIUM CHLORIDE SCH MLS/HR: 4.5 INJECTION, SOLUTION INTRAVENOUS at 03:16

## 2020-12-07 RX ADMIN — VITAMIN D, TAB 1000IU (100/BT) SCH UNIT: 25 TAB at 09:33

## 2020-12-07 RX ADMIN — PANTOPRAZOLE SODIUM SCH MG: 40 TABLET, DELAYED RELEASE ORAL at 09:32

## 2020-12-07 RX ADMIN — TAMSULOSIN HYDROCHLORIDE SCH MG: 0.4 CAPSULE ORAL at 18:21

## 2020-12-07 RX ADMIN — LOSARTAN POTASSIUM SCH MG: 25 TABLET, FILM COATED ORAL at 09:33

## 2020-12-07 NOTE — NUR
Per Jonathan SOLIS, the hospital will arrange transport for patient last time due to patient's 
insurance does not cover. Informed Jamal caregiver that transportation will  
patient at 8 PM.

## 2020-12-07 NOTE — NUR
Informed Jamal that the insurance does not cover for transportation and he has to pay 200 
dollars per Nereyda. Per Jamal the hospital arrange transportation for patient before and 
this is a lie. Nereyda paged.

## 2020-12-07 NOTE — NUR
I called [facility] and spoke with Karlie-she will call me back when IV ATB 
arrangements are complete.

## 2020-12-07 NOTE — NUR
[facility] (phone [phone]) will deliver IV ATB to patient's home between 
7-11pm this evening-they have already spoken with family member.  [facility] 
(phone [phone]) will send nurse out tomorrow to patient's home.  I relayed this 
information to nurse Feliz.  Feliz asked about transportation home, I let her know that 
there was no request for transportation home, and that every time patient has been here, 
family or caregiver has transported him home.

## 2020-12-07 NOTE — NUR
Attempted to call Jamal chaudhry

-------------------------------------------------------------------------------

Addendum: 12/07/20 at 1515 by JAZLYN GOSS RN

-------------------------------------------------------------------------------

Left a message to Jamal rushing patient is ready to . Awaiting to call back.

## 2020-12-07 NOTE — NUR
I received a call from [facility] asking that I re-fax home IV ATB and 
home health order to her along with clinical information for 12/5 and 12/6-faxed as 
requested.

## 2020-12-07 NOTE — NUR
Per Nereyda the insurance does not cover for transportation and Nereyda spoke with Jamal 
before and Jamal agreed to arrange transportation. Will talk to WILL.

## 2020-12-07 NOTE — NUR
Spoke to Nereyda stated family has to pay out of the pocket for a transportation. Will talk 
to family.

## 2020-12-07 NOTE — NUR
I called [facility] regarding home IV ATB/home health order 
([phone]).  She will call [facility] and provide them with authorization.  
She said they only home health agency they contract with in [place] is [facility]. I faxed home health order to [facility].

## 2020-12-07 NOTE — NUR
Spoke to Jamal - caregiver stated the hospital have to arrange to transportation. KAMRAN- 
Nereyda paged.

## 2020-12-07 NOTE — NUR
I spoke with Susy at [facility], she said patient was previously on service 
with them and they will be able to accept patient back-they will send nurse out tomorrow.  I 
called Irish at [facility]-she will send updated authorization to [facility].

## 2020-12-07 NOTE — NUR
called [facility] and spoke to [facility], and he advised transport will be there 
around 2130 or sooner. awaiting transport.

## 2020-12-07 NOTE — NUR
[facility]  # [phone] spoke to Art. will  patient at 8 PM call by WILL Berry notified. 

-------------------------------------------------------------------------------

Signed:    12/07/20 at 1737 by JAZLYN GOSS RN

-------------------------------------------------------------------------------

## 2020-12-07 NOTE — NUR
Discharge instructions given as ordered. Encourage to follow up with PMD as instructed. All 
questions and concerns addressed. Patient verbalized understanding.  Medication 
reconciliation form completed and copy given to patient. Midline IV and Merida to go with 
patient per RN dayshift report and MD notes.  Telemetry unit returned to ICU. Patient taken 
to vehicle via wheelchair with all personal belongings, accompanied by staff and family 
member. No distress noted at time of departure. vaccinations uptodate.

## 2020-12-07 NOTE — NUR
Spoke to BHAVANI Ramirez regarding patient is unable to swallow Potassium tablets, received new 
order to [facility].